# Patient Record
Sex: FEMALE | Race: BLACK OR AFRICAN AMERICAN | Employment: UNEMPLOYED | URBAN - METROPOLITAN AREA
[De-identification: names, ages, dates, MRNs, and addresses within clinical notes are randomized per-mention and may not be internally consistent; named-entity substitution may affect disease eponyms.]

---

## 2017-01-01 ENCOUNTER — TELEPHONE (OUTPATIENT)
Dept: CARDIOLOGY CLINIC | Age: 52
End: 2017-01-01

## 2017-01-01 ENCOUNTER — HOSPITAL ENCOUNTER (OUTPATIENT)
Dept: LAB | Age: 52
Discharge: HOME | End: 2017-09-05
Payer: MEDICARE

## 2017-01-01 ENCOUNTER — OFFICE VISIT (OUTPATIENT)
Dept: FAMILY MEDICINE CLINIC | Age: 52
End: 2017-01-01

## 2017-01-01 ENCOUNTER — HOSPITAL ENCOUNTER (OUTPATIENT)
Dept: LAB | Age: 52
Discharge: HOME | End: 2017-02-20
Payer: MEDICARE

## 2017-01-01 ENCOUNTER — HOSPITAL ENCOUNTER (OUTPATIENT)
Dept: LAB | Age: 52
Discharge: HOME | End: 2017-02-22
Payer: MEDICARE

## 2017-01-01 ENCOUNTER — TELEPHONE (OUTPATIENT)
Dept: FAMILY MEDICINE CLINIC | Age: 52
End: 2017-01-01

## 2017-01-01 ENCOUNTER — OFFICE VISIT (OUTPATIENT)
Dept: CARDIOLOGY CLINIC | Age: 52
End: 2017-01-01

## 2017-01-01 ENCOUNTER — HOSPITAL ENCOUNTER (OUTPATIENT)
Dept: CARDIAC CATH/INVASIVE PROCEDURES | Age: 52
Discharge: HOME | End: 2017-06-08
Attending: INTERNAL MEDICINE | Admitting: INTERNAL MEDICINE
Payer: MEDICARE

## 2017-01-01 ENCOUNTER — HOSPITAL ENCOUNTER (EMERGENCY)
Age: 52
Discharge: HOME | End: 2017-02-24
Attending: EMERGENCY MEDICINE
Payer: MEDICARE

## 2017-01-01 ENCOUNTER — HOSPITAL ENCOUNTER (OUTPATIENT)
Dept: MAMMOGRAPHY | Age: 52
Discharge: HOME | End: 2017-01-25
Attending: INTERNAL MEDICINE
Payer: MEDICARE

## 2017-01-01 ENCOUNTER — APPOINTMENT (OUTPATIENT)
Dept: CT IMAGING | Age: 52
End: 2017-01-01
Attending: EMERGENCY MEDICINE
Payer: MEDICARE

## 2017-01-01 ENCOUNTER — HOSPITAL ENCOUNTER (OUTPATIENT)
Dept: LAB | Age: 52
Discharge: HOME | End: 2017-07-17
Payer: MEDICARE

## 2017-01-01 VITALS
HEART RATE: 75 BPM | DIASTOLIC BLOOD PRESSURE: 73 MMHG | HEIGHT: 68 IN | SYSTOLIC BLOOD PRESSURE: 118 MMHG | WEIGHT: 149 LBS | BODY MASS INDEX: 22.58 KG/M2

## 2017-01-01 VITALS
HEART RATE: 79 BPM | OXYGEN SATURATION: 98 % | DIASTOLIC BLOOD PRESSURE: 90 MMHG | BODY MASS INDEX: 20.88 KG/M2 | TEMPERATURE: 98.1 F | SYSTOLIC BLOOD PRESSURE: 140 MMHG | RESPIRATION RATE: 18 BRPM | WEIGHT: 137.79 LBS | HEIGHT: 68 IN

## 2017-01-01 VITALS
HEART RATE: 86 BPM | OXYGEN SATURATION: 99 % | RESPIRATION RATE: 25 BRPM | HEIGHT: 68 IN | SYSTOLIC BLOOD PRESSURE: 140 MMHG | BODY MASS INDEX: 22.29 KG/M2 | WEIGHT: 147.05 LBS | DIASTOLIC BLOOD PRESSURE: 80 MMHG | TEMPERATURE: 98.5 F

## 2017-01-01 VITALS
WEIGHT: 152.5 LBS | BODY MASS INDEX: 23.11 KG/M2 | HEART RATE: 92 BPM | DIASTOLIC BLOOD PRESSURE: 114 MMHG | HEIGHT: 68 IN | RESPIRATION RATE: 20 BRPM | SYSTOLIC BLOOD PRESSURE: 182 MMHG | TEMPERATURE: 98.7 F

## 2017-01-01 VITALS
HEART RATE: 86 BPM | HEIGHT: 68 IN | TEMPERATURE: 98 F | WEIGHT: 142 LBS | SYSTOLIC BLOOD PRESSURE: 146 MMHG | DIASTOLIC BLOOD PRESSURE: 90 MMHG | BODY MASS INDEX: 21.52 KG/M2

## 2017-01-01 VITALS
HEIGHT: 68 IN | BODY MASS INDEX: 22.51 KG/M2 | SYSTOLIC BLOOD PRESSURE: 137 MMHG | TEMPERATURE: 100.1 F | RESPIRATION RATE: 16 BRPM | HEART RATE: 78 BPM | DIASTOLIC BLOOD PRESSURE: 87 MMHG | WEIGHT: 148.5 LBS

## 2017-01-01 VITALS
DIASTOLIC BLOOD PRESSURE: 91 MMHG | WEIGHT: 145 LBS | OXYGEN SATURATION: 97 % | TEMPERATURE: 98.2 F | RESPIRATION RATE: 16 BRPM | BODY MASS INDEX: 21.98 KG/M2 | HEART RATE: 82 BPM | SYSTOLIC BLOOD PRESSURE: 134 MMHG | HEIGHT: 68 IN

## 2017-01-01 VITALS
RESPIRATION RATE: 17 BRPM | SYSTOLIC BLOOD PRESSURE: 131 MMHG | HEIGHT: 68 IN | WEIGHT: 150 LBS | DIASTOLIC BLOOD PRESSURE: 80 MMHG | BODY MASS INDEX: 22.73 KG/M2 | OXYGEN SATURATION: 100 % | TEMPERATURE: 100.1 F | HEART RATE: 84 BPM

## 2017-01-01 VITALS
DIASTOLIC BLOOD PRESSURE: 71 MMHG | HEART RATE: 88 BPM | BODY MASS INDEX: 20.92 KG/M2 | SYSTOLIC BLOOD PRESSURE: 118 MMHG | HEIGHT: 68 IN | WEIGHT: 138 LBS

## 2017-01-01 DIAGNOSIS — R50.81 FEVER IN OTHER DISEASES: ICD-10-CM

## 2017-01-01 DIAGNOSIS — E55.9 VITAMIN D DEFICIENCY: ICD-10-CM

## 2017-01-01 DIAGNOSIS — N18.6 ESRD (END STAGE RENAL DISEASE) ON DIALYSIS (HCC): Chronic | ICD-10-CM

## 2017-01-01 DIAGNOSIS — D63.1 ANEMIA OF CHRONIC KIDNEY FAILURE, UNSPECIFIED STAGE: ICD-10-CM

## 2017-01-01 DIAGNOSIS — I27.20 PULMONARY HYPERTENSION (HCC): ICD-10-CM

## 2017-01-01 DIAGNOSIS — N18.6 ESRD (END STAGE RENAL DISEASE) (HCC): ICD-10-CM

## 2017-01-01 DIAGNOSIS — R11.2 NAUSEA AND VOMITING, INTRACTABILITY OF VOMITING NOT SPECIFIED, UNSPECIFIED VOMITING TYPE: ICD-10-CM

## 2017-01-01 DIAGNOSIS — K86.81 EXOCRINE PANCREATIC INSUFFICIENCY: ICD-10-CM

## 2017-01-01 DIAGNOSIS — K86.81 EXOCRINE PANCREATIC INSUFFICIENCY: Primary | ICD-10-CM

## 2017-01-01 DIAGNOSIS — J44.9 COPD MIXED TYPE (HCC): ICD-10-CM

## 2017-01-01 DIAGNOSIS — M62.838 MUSCLE SPASM: ICD-10-CM

## 2017-01-01 DIAGNOSIS — R11.2 NAUSEA AND VOMITING, INTRACTABILITY OF VOMITING NOT SPECIFIED, UNSPECIFIED VOMITING TYPE: Primary | ICD-10-CM

## 2017-01-01 DIAGNOSIS — J44.9 CHRONIC OBSTRUCTIVE PULMONARY DISEASE, UNSPECIFIED COPD TYPE (HCC): ICD-10-CM

## 2017-01-01 DIAGNOSIS — I12.0 BENIGN HYPERTENSION WITH ESRD (END-STAGE RENAL DISEASE) (HCC): ICD-10-CM

## 2017-01-01 DIAGNOSIS — I34.0 NON-RHEUMATIC MITRAL REGURGITATION: ICD-10-CM

## 2017-01-01 DIAGNOSIS — Z99.2 ESRD (END STAGE RENAL DISEASE) ON DIALYSIS (HCC): Chronic | ICD-10-CM

## 2017-01-01 DIAGNOSIS — N18.6 BENIGN HYPERTENSION WITH ESRD (END-STAGE RENAL DISEASE) (HCC): ICD-10-CM

## 2017-01-01 DIAGNOSIS — N18.6 ESRD (END STAGE RENAL DISEASE) ON DIALYSIS (HCC): Primary | Chronic | ICD-10-CM

## 2017-01-01 DIAGNOSIS — N90.89 LABIAL LESION: Primary | ICD-10-CM

## 2017-01-01 DIAGNOSIS — G89.4 CHRONIC PAIN SYNDROME: ICD-10-CM

## 2017-01-01 DIAGNOSIS — R25.2 JERKING MOVEMENTS OF EXTREMITIES: ICD-10-CM

## 2017-01-01 DIAGNOSIS — D75.829 HEPARIN INDUCED THROMBOCYTOPENIA: ICD-10-CM

## 2017-01-01 DIAGNOSIS — S40.022A HEMATOMA OF ARM, LEFT, INITIAL ENCOUNTER: Primary | ICD-10-CM

## 2017-01-01 DIAGNOSIS — I50.32 CHRONIC DIASTOLIC HEART FAILURE (HCC): Primary | ICD-10-CM

## 2017-01-01 DIAGNOSIS — M32.9 SYSTEMIC LUPUS ERYTHEMATOSUS, UNSPECIFIED SLE TYPE, UNSPECIFIED ORGAN INVOLVEMENT STATUS (HCC): ICD-10-CM

## 2017-01-01 DIAGNOSIS — E55.9 VITAMIN D DEFICIENCY: Primary | ICD-10-CM

## 2017-01-01 DIAGNOSIS — Z12.31 VISIT FOR SCREENING MAMMOGRAM: ICD-10-CM

## 2017-01-01 DIAGNOSIS — N18.9 ANEMIA OF CHRONIC KIDNEY FAILURE, UNSPECIFIED STAGE: ICD-10-CM

## 2017-01-01 DIAGNOSIS — I50.32 CHRONIC DIASTOLIC HEART FAILURE (HCC): ICD-10-CM

## 2017-01-01 DIAGNOSIS — N90.89 LESION OF LABIA: ICD-10-CM

## 2017-01-01 DIAGNOSIS — Z01.419 WELL WOMAN EXAM WITH ROUTINE GYNECOLOGICAL EXAM: Primary | ICD-10-CM

## 2017-01-01 DIAGNOSIS — I05.9 MITRAL VALVE DISORDERS(424.0): ICD-10-CM

## 2017-01-01 DIAGNOSIS — N18.6 ESRD (END STAGE RENAL DISEASE) (HCC): Primary | ICD-10-CM

## 2017-01-01 DIAGNOSIS — M32.9 LUPUS (HCC): ICD-10-CM

## 2017-01-01 DIAGNOSIS — R25.2 JERKING MOVEMENTS OF EXTREMITIES: Primary | ICD-10-CM

## 2017-01-01 DIAGNOSIS — Z99.2 ESRD (END STAGE RENAL DISEASE) ON DIALYSIS (HCC): Primary | Chronic | ICD-10-CM

## 2017-01-01 LAB
25(OH)D3 SERPL-MCNC: 9.1 NG/ML (ref 30–100)
A-G RATIO,AGRAT: 0.7 RATIO (ref 1.1–2.6)
ALBUMIN SERPL BCP-MCNC: 3.1 G/DL (ref 3.4–5)
ALBUMIN SERPL-MCNC: 3.6 G/DL (ref 3.5–5)
ALBUMIN/GLOB SERPL: 0.5 {RATIO} (ref 0.8–1.7)
ALP SERPL-CCNC: 245 U/L (ref 45–117)
ALP SERPL-CCNC: 295 U/L (ref 25–115)
ALT SERPL-CCNC: 15 U/L (ref 5–40)
ALT SERPL-CCNC: 21 U/L (ref 13–56)
ANION GAP BLD CALC-SCNC: 6 MMOL/L (ref 3–18)
ANION GAP BLD CALC-SCNC: 8 MMOL/L (ref 3–18)
ANION GAP SERPL CALC-SCNC: 16.2 MMOL/L
APTT PPP: 29 SEC (ref 22–36)
AST SERPL W P-5'-P-CCNC: 13 U/L (ref 15–37)
AST SERPL W P-5'-P-CCNC: 15 U/L (ref 10–37)
BASOPHILS # BLD AUTO: 0 K/UL (ref 0–0.06)
BASOPHILS # BLD: 1 % (ref 0–2)
BILIRUB SERPL-MCNC: 0.3 MG/DL (ref 0.2–1)
BILIRUB SERPL-MCNC: 0.3 MG/DL (ref 0.2–1.2)
BUN SERPL-MCNC: 25 MG/DL (ref 7–18)
BUN SERPL-MCNC: 50 MG/DL (ref 6–22)
BUN SERPL-MCNC: 60 MG/DL (ref 7–18)
BUN/CREAT SERPL: 11 (ref 12–20)
BUN/CREAT SERPL: 6 (ref 12–20)
CA-I SERPL ISE-MCNC: 4.7 MG/DL (ref 4.5–5.6)
CALCIUM SERPL-MCNC: 10 MG/DL (ref 8.5–10.1)
CALCIUM SERPL-MCNC: 8.2 MG/DL (ref 8.5–10.1)
CALCIUM SERPL-MCNC: 9.7 MG/DL (ref 8.4–10.5)
CHLORIDE SERPL-SCNC: 90 MMOL/L (ref 100–108)
CHLORIDE SERPL-SCNC: 94 MMOL/L (ref 100–108)
CHLORIDE SERPL-SCNC: 94 MMOL/L (ref 98–110)
CO2 SERPL-SCNC: 29 MMOL/L (ref 20–32)
CO2 SERPL-SCNC: 32 MMOL/L (ref 21–32)
CO2 SERPL-SCNC: 34 MMOL/L (ref 21–32)
CREAT SERPL-MCNC: 4.45 MG/DL (ref 0.6–1.3)
CREAT SERPL-MCNC: 5.58 MG/DL (ref 0.6–1.3)
CREAT SERPL-MCNC: 6.3 MG/DL (ref 0.5–1.2)
DIFFERENTIAL METHOD BLD: ABNORMAL
EOSINOPHIL # BLD: 0.1 K/UL (ref 0–0.4)
EOSINOPHIL NFR BLD: 2 % (ref 0–5)
ERYTHROCYTE [DISTWIDTH] IN BLOOD BY AUTOMATED COUNT: 16.8 % (ref 11.6–14.5)
ERYTHROCYTE [DISTWIDTH] IN BLOOD BY AUTOMATED COUNT: 16.9 % (ref 10–16)
ERYTHROCYTE [DISTWIDTH] IN BLOOD BY AUTOMATED COUNT: 17.4 % (ref 11.6–14.5)
GFRAA, 66117: 8.7
GFRNA, 66118: 7.2
GLOBULIN SER CALC-MCNC: 5.9 G/DL (ref 2–4)
GLOBULIN,GLOB: 5.3 G/DL (ref 2–4)
GLUCOSE BLD STRIP.AUTO-MCNC: 126 MG/DL (ref 70–110)
GLUCOSE SERPL-MCNC: 106 MG/DL (ref 65–99)
GLUCOSE SERPL-MCNC: 116 MG/DL (ref 74–99)
GLUCOSE SERPL-MCNC: 136 MG/DL (ref 74–99)
HCT VFR BLD AUTO: 33.3 % (ref 35–45)
HCT VFR BLD AUTO: 36.4 % (ref 35–45)
HCT VFR BLD AUTO: 36.9 % (ref 35.1–48)
HGB BLD-MCNC: 10.2 G/DL (ref 12–16)
HGB BLD-MCNC: 11.3 G/DL (ref 11.7–16)
HGB BLD-MCNC: 11.9 G/DL (ref 12–16)
INR PPP: 1 (ref 0.8–1.2)
INR PPP: 1.1 (ref 0.89–1.29)
LYMPHOCYTES # BLD AUTO: 24 % (ref 21–52)
LYMPHOCYTES # BLD: 0.8 K/UL (ref 0.9–3.6)
MAGNESIUM SERPL-MCNC: 2.1 MG/DL (ref 1.6–2.6)
MAGNESIUM SERPL-MCNC: 2.3 MG/DL (ref 1.6–2.5)
MCH RBC QN AUTO: 26 PG (ref 26–34)
MCH RBC QN AUTO: 26.8 PG (ref 24–34)
MCH RBC QN AUTO: 27.9 PG (ref 24–34)
MCHC RBC AUTO-ENTMCNC: 30.6 G/DL (ref 31–37)
MCHC RBC AUTO-ENTMCNC: 31 G/DL (ref 32–36)
MCHC RBC AUTO-ENTMCNC: 32.7 G/DL (ref 31–37)
MCV RBC AUTO: 85.2 FL (ref 74–97)
MCV RBC AUTO: 86 FL (ref 80–95)
MCV RBC AUTO: 87.6 FL (ref 74–97)
MONOCYTES # BLD: 0.4 K/UL (ref 0.05–1.2)
MONOCYTES NFR BLD AUTO: 10 % (ref 3–10)
NEUTS SEG # BLD: 2.2 K/UL (ref 1.8–8)
NEUTS SEG NFR BLD AUTO: 63 % (ref 40–73)
PHOSPHATE SERPL-MCNC: 3.1 MG/DL (ref 2.5–4.9)
PLATELET # BLD AUTO: 101 K/UL (ref 140–440)
PLATELET # BLD AUTO: 113 K/UL (ref 135–420)
PLATELET # BLD AUTO: 145 K/UL (ref 135–420)
PMV BLD AUTO: 10 FL (ref 9.2–11.8)
PMV BLD AUTO: 10.5 FL (ref 9.2–11.8)
PMV BLD AUTO: 9.6 FL (ref 6–10.8)
POTASSIUM SERPL-SCNC: 3.6 MMOL/L (ref 3.5–5.5)
POTASSIUM SERPL-SCNC: 3.6 MMOL/L (ref 3.5–5.5)
POTASSIUM SERPL-SCNC: 5.7 MMOL/L (ref 3.5–5.5)
PROT SERPL-MCNC: 8.9 G/DL (ref 6.4–8.3)
PROT SERPL-MCNC: 9 G/DL (ref 6.4–8.2)
PROTHROMBIN TIME: 11.4 SEC (ref 9–13)
PROTHROMBIN TIME: 12.4 SEC (ref 11.5–15.2)
RBC # BLD AUTO: 3.8 M/UL (ref 4.2–5.3)
RBC # BLD AUTO: 4.27 M/UL (ref 4.2–5.3)
RBC # BLD AUTO: 4.29 M/UL (ref 3.8–5.2)
SODIUM SERPL-SCNC: 130 MMOL/L (ref 136–145)
SODIUM SERPL-SCNC: 134 MMOL/L (ref 136–145)
SODIUM SERPL-SCNC: 139 MMOL/L (ref 133–145)
TSH SERPL DL<=0.005 MIU/L-ACNC: 1.57 MCU/ML (ref 0.27–4.2)
WBC # BLD AUTO: 3 K/UL (ref 4–11)
WBC # BLD AUTO: 3.5 K/UL (ref 4.6–13.2)
WBC # BLD AUTO: 4 K/UL (ref 4.6–13.2)

## 2017-01-01 PROCEDURE — 36415 COLL VENOUS BLD VENIPUNCTURE: CPT | Performed by: INTERNAL MEDICINE

## 2017-01-01 PROCEDURE — 73200 CT UPPER EXTREMITY W/O DYE: CPT

## 2017-01-01 PROCEDURE — 74011636320 HC RX REV CODE- 636/320: Performed by: INTERNAL MEDICINE

## 2017-01-01 PROCEDURE — 99152 MOD SED SAME PHYS/QHP 5/>YRS: CPT

## 2017-01-01 PROCEDURE — 74011250636 HC RX REV CODE- 250/636

## 2017-01-01 PROCEDURE — C1894 INTRO/SHEATH, NON-LASER: HCPCS

## 2017-01-01 PROCEDURE — 74011000250 HC RX REV CODE- 250: Performed by: INTERNAL MEDICINE

## 2017-01-01 PROCEDURE — 77063 BREAST TOMOSYNTHESIS BI: CPT

## 2017-01-01 PROCEDURE — 77030004534 HC CATH ANGI DX INFN CARD -A

## 2017-01-01 PROCEDURE — 85610 PROTHROMBIN TIME: CPT | Performed by: INTERNAL MEDICINE

## 2017-01-01 PROCEDURE — 77030013797 HC KT TRNSDUC PRSSR EDWD -A

## 2017-01-01 PROCEDURE — C1769 GUIDE WIRE: HCPCS

## 2017-01-01 PROCEDURE — C1751 CATH, INF, PER/CENT/MIDLINE: HCPCS

## 2017-01-01 PROCEDURE — 74011250636 HC RX REV CODE- 250/636: Performed by: INTERNAL MEDICINE

## 2017-01-01 PROCEDURE — 85027 COMPLETE CBC AUTOMATED: CPT | Performed by: INTERNAL MEDICINE

## 2017-01-01 PROCEDURE — 99282 EMERGENCY DEPT VISIT SF MDM: CPT

## 2017-01-01 PROCEDURE — 74011000250 HC RX REV CODE- 250

## 2017-01-01 PROCEDURE — 83735 ASSAY OF MAGNESIUM: CPT | Performed by: INTERNAL MEDICINE

## 2017-01-01 PROCEDURE — 93460 R&L HRT ART/VENTRICLE ANGIO: CPT

## 2017-01-01 PROCEDURE — 84100 ASSAY OF PHOSPHORUS: CPT | Performed by: INTERNAL MEDICINE

## 2017-01-01 PROCEDURE — 80048 BASIC METABOLIC PNL TOTAL CA: CPT | Performed by: INTERNAL MEDICINE

## 2017-01-01 PROCEDURE — 82962 GLUCOSE BLOOD TEST: CPT

## 2017-01-01 PROCEDURE — 82330 ASSAY OF CALCIUM: CPT | Performed by: INTERNAL MEDICINE

## 2017-01-01 PROCEDURE — 74011250637 HC RX REV CODE- 250/637: Performed by: INTERNAL MEDICINE

## 2017-01-01 PROCEDURE — 88175 CYTOPATH C/V AUTO FLUID REDO: CPT | Performed by: NURSE PRACTITIONER

## 2017-01-01 PROCEDURE — 99153 MOD SED SAME PHYS/QHP EA: CPT

## 2017-01-01 PROCEDURE — 82306 VITAMIN D 25 HYDROXY: CPT | Performed by: INTERNAL MEDICINE

## 2017-01-01 PROCEDURE — 85025 COMPLETE CBC W/AUTO DIFF WBC: CPT | Performed by: INTERNAL MEDICINE

## 2017-01-01 PROCEDURE — 80053 COMPREHEN METABOLIC PANEL: CPT | Performed by: INTERNAL MEDICINE

## 2017-01-01 RX ORDER — MIDAZOLAM HYDROCHLORIDE 1 MG/ML
.5-2 INJECTION, SOLUTION INTRAMUSCULAR; INTRAVENOUS
Status: DISCONTINUED | OUTPATIENT
Start: 2017-01-01 | End: 2017-01-01 | Stop reason: HOSPADM

## 2017-01-01 RX ORDER — SODIUM CHLORIDE 0.9 % (FLUSH) 0.9 %
5-10 SYRINGE (ML) INJECTION EVERY 8 HOURS
Status: DISCONTINUED | OUTPATIENT
Start: 2017-01-01 | End: 2017-01-01 | Stop reason: HOSPADM

## 2017-01-01 RX ORDER — LABETALOL HYDROCHLORIDE 5 MG/ML
INJECTION, SOLUTION INTRAVENOUS
Status: COMPLETED
Start: 2017-01-01 | End: 2017-01-01

## 2017-01-01 RX ORDER — DIPHENHYDRAMINE HCL 25 MG
25 CAPSULE ORAL 2 TIMES DAILY WITH MEALS
Qty: 4 CAP | Refills: 0 | Status: SHIPPED | OUTPATIENT
Start: 2017-01-01 | End: 2017-01-01

## 2017-01-01 RX ORDER — HEPARIN SODIUM 200 [USP'U]/100ML
500 INJECTION, SOLUTION INTRAVENOUS ONCE
Status: COMPLETED | OUTPATIENT
Start: 2017-01-01 | End: 2017-01-01

## 2017-01-01 RX ORDER — PROMETHAZINE HYDROCHLORIDE 25 MG/1
25 TABLET ORAL
Qty: 30 TAB | Refills: 3 | Status: SHIPPED | OUTPATIENT
Start: 2017-01-01 | End: 2017-01-01 | Stop reason: SDUPTHER

## 2017-01-01 RX ORDER — AMLODIPINE BESYLATE 10 MG/1
TABLET ORAL
Qty: 30 TAB | Refills: 6 | Status: SHIPPED | OUTPATIENT
Start: 2017-01-01 | End: 2017-01-01 | Stop reason: SDUPTHER

## 2017-01-01 RX ORDER — TRIMETHOBENZAMIDE HYDROCHLORIDE 300 MG/1
300 CAPSULE ORAL
Qty: 60 CAP | Refills: 0 | Status: SHIPPED | OUTPATIENT
Start: 2017-01-01 | End: 2017-01-01 | Stop reason: SDUPTHER

## 2017-01-01 RX ORDER — TRIMETHOBENZAMIDE HYDROCHLORIDE 300 MG/1
300 CAPSULE ORAL
Qty: 30 CAP | Refills: 0 | Status: SHIPPED | OUTPATIENT
Start: 2017-01-01 | End: 2017-01-01 | Stop reason: SDUPTHER

## 2017-01-01 RX ORDER — ERGOCALCIFEROL 1.25 MG/1
50000 CAPSULE ORAL
Qty: 4 CAP | Refills: 6 | Status: SHIPPED | OUTPATIENT
Start: 2017-01-01

## 2017-01-01 RX ORDER — FENTANYL CITRATE 50 UG/ML
12.5-1 INJECTION, SOLUTION INTRAMUSCULAR; INTRAVENOUS
Status: DISCONTINUED | OUTPATIENT
Start: 2017-01-01 | End: 2017-01-01 | Stop reason: HOSPADM

## 2017-01-01 RX ORDER — LABETALOL HYDROCHLORIDE 5 MG/ML
20 INJECTION, SOLUTION INTRAVENOUS ONCE
Status: COMPLETED | OUTPATIENT
Start: 2017-01-01 | End: 2017-01-01

## 2017-01-01 RX ORDER — SODIUM CHLORIDE 0.9 % (FLUSH) 0.9 %
5-10 SYRINGE (ML) INJECTION AS NEEDED
Status: DISCONTINUED | OUTPATIENT
Start: 2017-01-01 | End: 2017-01-01 | Stop reason: HOSPADM

## 2017-01-01 RX ORDER — ONDANSETRON 2 MG/ML
4 INJECTION INTRAMUSCULAR; INTRAVENOUS ONCE
Status: COMPLETED | OUTPATIENT
Start: 2017-01-01 | End: 2017-01-01

## 2017-01-01 RX ORDER — HYDRALAZINE HYDROCHLORIDE 20 MG/ML
20 INJECTION INTRAMUSCULAR; INTRAVENOUS ONCE
Status: COMPLETED | OUTPATIENT
Start: 2017-01-01 | End: 2017-01-01

## 2017-01-01 RX ORDER — AMLODIPINE BESYLATE 10 MG/1
TABLET ORAL
Qty: 30 TAB | Refills: 4 | Status: SHIPPED | OUTPATIENT
Start: 2017-01-01

## 2017-01-01 RX ORDER — LISINOPRIL 5 MG/1
TABLET ORAL
Qty: 30 TAB | Refills: 6 | Status: SHIPPED | OUTPATIENT
Start: 2017-01-01 | End: 2017-01-01 | Stop reason: ALTCHOICE

## 2017-01-01 RX ORDER — FAMOTIDINE 20 MG/1
20 TABLET, FILM COATED ORAL 2 TIMES DAILY
Qty: 4 TAB | Refills: 0 | Status: SHIPPED | OUTPATIENT
Start: 2017-01-01 | End: 2017-01-01

## 2017-01-01 RX ORDER — ALBUTEROL SULFATE 90 UG/1
2 AEROSOL, METERED RESPIRATORY (INHALATION)
Qty: 1 INHALER | Refills: 6 | Status: SHIPPED | OUTPATIENT
Start: 2017-01-01

## 2017-01-01 RX ORDER — VERAPAMIL HYDROCHLORIDE 2.5 MG/ML
5 INJECTION, SOLUTION INTRAVENOUS ONCE
Status: DISCONTINUED | OUTPATIENT
Start: 2017-01-01 | End: 2017-01-01 | Stop reason: HOSPADM

## 2017-01-01 RX ORDER — CARVEDILOL 25 MG/1
TABLET ORAL
Qty: 60 TAB | Refills: 1 | Status: SHIPPED | OUTPATIENT
Start: 2017-01-01

## 2017-01-01 RX ORDER — HYDRALAZINE HYDROCHLORIDE 20 MG/ML
INJECTION INTRAMUSCULAR; INTRAVENOUS
Status: COMPLETED
Start: 2017-01-01 | End: 2017-01-01

## 2017-01-01 RX ORDER — TRIMETHOBENZAMIDE HYDROCHLORIDE 300 MG/1
CAPSULE ORAL
Qty: 60 CAP | Refills: 0 | Status: SHIPPED | OUTPATIENT
Start: 2017-01-01

## 2017-01-01 RX ORDER — LIDOCAINE HYDROCHLORIDE 10 MG/ML
1-30 INJECTION, SOLUTION EPIDURAL; INFILTRATION; INTRACAUDAL; PERINEURAL
Status: DISCONTINUED | OUTPATIENT
Start: 2017-01-01 | End: 2017-01-01 | Stop reason: HOSPADM

## 2017-01-01 RX ORDER — SUCRALFATE 1 G/10ML
SUSPENSION ORAL
Qty: 240 ML | Refills: 0 | Status: SHIPPED | OUTPATIENT
Start: 2017-01-01

## 2017-01-01 RX ORDER — HEPARIN SODIUM 1000 [USP'U]/ML
1000-10000 INJECTION, SOLUTION INTRAVENOUS; SUBCUTANEOUS
Status: DISCONTINUED | OUTPATIENT
Start: 2017-01-01 | End: 2017-01-01 | Stop reason: HOSPADM

## 2017-01-01 RX ORDER — ONDANSETRON 2 MG/ML
INJECTION INTRAMUSCULAR; INTRAVENOUS
Status: COMPLETED
Start: 2017-01-01 | End: 2017-01-01

## 2017-01-01 RX ORDER — PREDNISONE 20 MG/1
20 TABLET ORAL 2 TIMES DAILY WITH MEALS
Qty: 4 TAB | Refills: 0 | Status: SHIPPED | OUTPATIENT
Start: 2017-01-01 | End: 2017-01-01

## 2017-01-01 RX ORDER — ERGOCALCIFEROL 1.25 MG/1
50000 CAPSULE ORAL
Qty: 12 CAP | Refills: 3 | Status: SHIPPED | OUTPATIENT
Start: 2017-01-01 | End: 2017-01-01 | Stop reason: SDUPTHER

## 2017-01-01 RX ORDER — OXYCODONE HYDROCHLORIDE 5 MG/1
15 TABLET ORAL
Status: DISCONTINUED | OUTPATIENT
Start: 2017-01-01 | End: 2017-01-01 | Stop reason: HOSPADM

## 2017-01-01 RX ADMIN — LABETALOL HYDROCHLORIDE 10 MG: 5 INJECTION, SOLUTION INTRAVENOUS at 11:25

## 2017-01-01 RX ADMIN — FENTANYL CITRATE 25 MCG: 50 INJECTION INTRAMUSCULAR; INTRAVENOUS at 11:14

## 2017-01-01 RX ADMIN — HYDRALAZINE HYDROCHLORIDE 10 MG: 20 INJECTION INTRAMUSCULAR; INTRAVENOUS at 11:18

## 2017-01-01 RX ADMIN — ONDANSETRON 4 MG: 2 INJECTION INTRAMUSCULAR; INTRAVENOUS at 08:55

## 2017-01-01 RX ADMIN — HYDRALAZINE HYDROCHLORIDE 10 MG: 20 INJECTION INTRAMUSCULAR; INTRAVENOUS at 12:13

## 2017-01-01 RX ADMIN — HEPARIN SODIUM 1000 UNITS: 200 INJECTION, SOLUTION INTRAVENOUS at 11:27

## 2017-01-01 RX ADMIN — FENTANYL CITRATE 25 MCG: 50 INJECTION INTRAMUSCULAR; INTRAVENOUS at 12:12

## 2017-01-01 RX ADMIN — LIDOCAINE HYDROCHLORIDE 24 ML: 10 INJECTION, SOLUTION EPIDURAL; INFILTRATION; INTRACAUDAL; PERINEURAL at 11:26

## 2017-01-01 RX ADMIN — IOPAMIDOL 50 ML: 612 INJECTION, SOLUTION INTRAVENOUS at 12:26

## 2017-01-01 RX ADMIN — LABETALOL HYDROCHLORIDE 10 MG: 5 INJECTION, SOLUTION INTRAVENOUS at 12:24

## 2017-01-01 RX ADMIN — MIDAZOLAM HYDROCHLORIDE 1 MG: 1 INJECTION, SOLUTION INTRAMUSCULAR; INTRAVENOUS at 11:14

## 2017-01-01 RX ADMIN — OXYCODONE HYDROCHLORIDE 15 MG: 5 TABLET ORAL at 16:00

## 2017-01-01 RX ADMIN — FENTANYL CITRATE 25 MCG: 50 INJECTION INTRAMUSCULAR; INTRAVENOUS at 11:18

## 2017-01-01 RX ADMIN — MIDAZOLAM HYDROCHLORIDE 1 MG: 1 INJECTION, SOLUTION INTRAMUSCULAR; INTRAVENOUS at 12:12

## 2017-01-01 RX ADMIN — OXYCODONE HYDROCHLORIDE 15 MG: 5 TABLET ORAL at 10:16

## 2017-01-18 NOTE — PROGRESS NOTES
HISTORY OF PRESENT ILLNESS  Sherri Guevara is a 54 y.o. female. Hypertension   The history is provided by the patient. This is a chronic problem. The current episode started more than 1 week ago. The problem occurs daily. The problem has been gradually improving. Pertinent negatives include no chest pain, no abdominal pain, no headaches and no shortness of breath. CHF   The history is provided by the patient. This is a chronic problem. The current episode started more than 1 week ago. The problem occurs every several days. The problem has not changed since onset. Pertinent negatives include no chest pain, no abdominal pain, no headaches and no shortness of breath. Valvular Heart Disease   The history is provided by the patient. This is a chronic problem. The current episode started more than 1 week ago. The problem occurs every several days. The problem has not changed since onset. Pertinent negatives include no chest pain, no abdominal pain, no headaches and no shortness of breath. Review of Systems   Constitutional: Negative for chills, diaphoresis, fever and weight loss. HENT: Negative for ear pain and hearing loss. Eyes: Negative for blurred vision. Respiratory: Negative for cough, hemoptysis, sputum production, shortness of breath, wheezing and stridor. Cardiovascular: Negative for chest pain, palpitations, orthopnea, claudication, leg swelling and PND. Gastrointestinal: Negative for abdominal pain, heartburn, nausea and vomiting. Musculoskeletal: Negative for myalgias and neck pain. Skin: Negative for rash. Neurological: Negative for dizziness, tingling, tremors, focal weakness, loss of consciousness, weakness and headaches. Psychiatric/Behavioral: Negative for depression and suicidal ideas.      Family History   Problem Relation Age of Onset    Hypertension Mother     Heart Disease Mother     Stroke Father     Heart Disease Father     Heart Disease Maternal Aunt     Diabetes Maternal Aunt        Past Medical History   Diagnosis Date    Cardiomegaly 10/1/2013    Chronic diastolic heart failure ([address] 10/1/2013     sob on activity     Chronic diastolic heart failure (HCC)     Chronic kidney disease      dialysis    Chronic obstructive pulmonary disease ([address] 10/1/2013    Chronic pain     Cirrhosis ([address]     Dialysis patient ([address] 12/17/2012    ESRD (end stage renal disease) ([address]     ESRD (end stage renal disease) ([address] 7/27/2016     HD since 9/11     Essential hypertension, benign 10/1/2013     severly elevated     Essential hypertension, benign 10/1/2013     severly elevated     Hypertension     Lupus ([address]     Mitral valve disorders 10/1/2013    Mitral valve disorders 10/1/2013    On home oxygen therapy      3 liters /min NC    Pancreatitis     Pancreatitis chronic     Precordial pain     Stroke ([address]      1998    Tricuspid valve disorders, specified as nonrheumatic 10/1/2013       Past Surgical History   Procedure Laterality Date    Hx cholecystectomy      Hx gyn       BTL    Hx gyn       4 c-sections    Hx other surgical       renal dialysis shunt    Hx other surgical       cysts in the axillary regions -        Social History   Substance Use Topics    Smoking status: Former Smoker     Packs/day: 1.00     Years: 19.00     Types: Cigarettes     Quit date: 1/1/2003    Smokeless tobacco: Never Used    Alcohol use No       Allergies   Allergen Reactions    Contrast Agent [Iodine] Angioedema    Heparin Analogues Other (comments)     Thrombocytopenia, positive HIT panel (5/15/15)    Prednisone Other (comments)     Became comatosed       Outpatient Prescriptions Marked as Taking for the 1/18/17 encounter (Office Visit) with Salima Goodson MD   Medication Sig Dispense Refill    promethazine (PHENERGAN) 25 mg tablet Take 1 Tab by mouth every six (6) hours as needed for Nausea.  12 Tab 3    Blood-Glucose Meter monitoring kit Check blood fasting blood sugar and also after largest meal of the day 1 Kit 0    glucose blood VI test strips (BLOOD GLUCOSE TEST) strip Check Fasting Blood Sugar and after largest meal of the day 100 Strip 3    lancets 32 gauge misc 100 Each by Does Not Apply route two (2) times daily (with meals). 100 Lancet 1    carvedilol (COREG) 25 mg tablet Take 1 Tab by mouth two (2) times daily (with meals). 60 Tab 6    umeclidinium-vilanterol (ANORO ELLIPTA) 62.5-25 mcg/actuation inhaler Take 1 Puff by inhalation daily. Indications: BRONCHOSPASM PREVENTION WITH COPD 1 Inhaler 3    FOLIC ACID/VIT BCOMP,C (MIROSLAVA-IAIN PO) Take  by mouth.  amLODIPine (NORVASC) 10 mg tablet Take 1 Tab by mouth daily. 30 Tab 6    oxyCODONE-acetaminophen (PERCOCET 7.5) 7.5-325 mg per tablet Take 2 Tabs by mouth every four (4) hours as needed. Max Daily Amount: 12 Tabs. 20 Tab 0    albuterol-ipratropium (DUO-NEB) 2.5 mg-0.5 mg/3 ml nebu 3 mL by Nebulization route.  amylase-lipase-protease (CREON) 12,000-38,000 -60,000 unit capsule Take 1 Cap by mouth three (3) times daily (with meals). (Patient taking differently: Take  by mouth daily.) 90 Cap 0    sevelamer carbonate (RENVELA) 800 mg tab tab Take 3 Tabs by mouth three (3) times daily (with meals). 90 Tab 0    LORazepam (ATIVAN) 0.5 mg tablet Take 1 Tab by mouth two (2) times daily as needed for Anxiety. Max Daily Amount: 1 mg. 30 Tab 0    aspirin delayed-release 81 mg tablet Take  by mouth daily.  albuterol (PROVENTIL HFA, VENTOLIN HFA) 90 mcg/actuation inhaler Take 2 Puffs by inhalation every six (6) hours as needed.  ondansetron hcl (ZOFRAN) 4 mg tablet Take 4 mg by mouth every eight (8) hours as needed.  oxyCODONE IR (ROXICODONE) 15 mg immediate release tablet Take 15 mg by mouth two (2) times daily as needed.             Visit Vitals    /71    Pulse 88    Ht 5' 8\" (1.727 m)    Wt 62.6 kg (138 lb)    BMI 20.98 kg/m2     Physical Exam   Constitutional: She is oriented to person, place, and time. She appears well-developed and well-nourished. No distress. HENT:   Head: Atraumatic. Mouth/Throat: No oropharyngeal exudate. Eyes: Conjunctivae are normal. No scleral icterus. Neck: Neck supple. No JVD present. Cardiovascular: Normal rate and regular rhythm. Exam reveals no gallop. Murmur: 2/6 ejection systolic murmur best heard at aortic area and apexl. Pulmonary/Chest: Effort normal and breath sounds normal. No stridor. She has no wheezes. She has no rales. Abdominal: Soft. There is no tenderness. There is no rebound. Musculoskeletal: Normal range of motion. She exhibits no edema or tenderness. Neurological: She is alert and oriented to person, place, and time. She exhibits normal muscle tone. Skin: Skin is warm. She is not diaphoretic. Psychiatric: She has a normal mood and affect. Her behavior is normal.       ASSESSMENT and PLAN    ICD-10-CM ICD-9-CM    1. Chronic diastolic heart failure ([facility]) I50.32 428.32    2. Mitral valve disorders I05.9 424.0    3. Benign hypertension with ESRD (end-stage renal disease) ([facility]) I12.0 403.11     N18.6 585.6    4. ESRD (end stage renal disease) on dialysis ([facility]) N18.6 585.6     Z99.2 V45.11    5. Lupus ([facility] M32.9 710.0    6. Anemia of chronic kidney failure, unspecified stage N18.9 285.21     D63.1     7. ESRD (end stage renal disease) ([facility] N18.6 585.6      No orders of the defined types were placed in this encounter. Follow-up Disposition:  Return in about 6 months (around 7/18/2017). current treatment plan is effective, no change in therapy  reviewed diet, exercise and weight control  use of aspirin to prevent MI and TIA's discussed. Patient's BP is significantly better. Has MR murmur is also better than prior examinations.   Continue current meds

## 2017-01-18 NOTE — MR AVS SNAPSHOT
Visit Information Date & Time Provider Department Dept. Phone Encounter #  
 1/18/2017  1:15 PM Helena Villafana MD [facility] [phone] Follow-up Instructions Return in about 6 months (around 7/18/2017). Follow-up and Disposition History Your Appointments 2/20/2017 11:30 AM  
PAP/PELVIC with Bry Hwang NP [address] (--) Appt Note: Well Woman Exam  
 Mattie Asif [address] [address]  
  
    
 2/22/2017  1:15 PM  
Follow Up with Chen Garner DO [address] (--) Appt Note: fu  
 Mattie [address] [place] [address] Road [address]  
  
    
 7/26/2017  1:15 PM  
Follow Up with Helena Villafana MD  
[facility] ([facility]) Appt Note: 6 month follow up  
 [address] [facility] [address]  
  
   
 [address] [address] [address] [address] Upcoming Health Maintenance Date Due Pneumococcal 19-64 Highest Risk (1 of 3 - PCV13) 7/1/1986 DTaP/Tdap/Td series (1 - Tdap) 7/1/1988 PAP AKA CERVICAL CYTOLOGY 7/1/1988 INFLUENZA AGE 9 TO ADULT 8/1/2016 Allergies as of 1/18/2017  Review Complete On: 1/18/2017 By: Helena Villafana MD  
  
 Severity Noted Reaction Type Reactions Contrast Agent [Iodine] High 05/13/2015    Angioedema Heparin Analogues High 05/20/2015   Side Effect Other (comments) Thrombocytopenia, positive HIT panel (5/15/15) Prednisone  05/13/2015    Other (comments) Became comatosed Current Immunizations  Reviewed on 5/17/2015 No immunizations on file. Not reviewed this visit You Were Diagnosed With   
  
 Codes Comments  Chronic diastolic heart failure (HCC)    -  Primary ICD-10-CM: I50.32 
ICD-9-CM: 428.32   
 Mitral valve disorders     ICD-10-CM: I05.9 ICD-9-CM: 424.0 Benign hypertension with ESRD (end-stage renal disease) ([facility]     ICD-10-CM: I12.0, N18.6 ICD-9-CM: 403.11, 585.6 ESRD (end stage renal disease) on dialysis ([facility]     ICD-10-CM: N18.6, Z99.2 ICD-9-CM: 585.6, V45.11 Lupus ([facility]     ICD-10-CM: M32.9 ICD-9-CM: 710.0 Anemia of chronic kidney failure, unspecified stage     ICD-10-CM: N18.9, D63.1 ICD-9-CM: 285.21   
 ESRD (end stage renal disease) ([facility]     ICD-10-CM: N18.6 ICD-9-CM: 120. 6 Vitals BP Pulse Height(growth percentile) Weight(growth percentile) BMI OB Status 118/71 88 5' 8\" (1.727 m) 138 lb (62.6 kg) 20.98 kg/m2 Medically Induced Smoking Status Former Smoker Vitals History BMI and BSA Data Body Mass Index Body Surface Area  
 20.98 kg/m 2 1.73 m 2 Preferred Pharmacy Pharmacy Name Phone [phone], [address] Your Updated Medication List  
  
   
This list is accurate as of: 1/18/17  2:59 PM.  Always use your most recent med list.  
  
  
  
  
 albuterol 90 mcg/actuation inhaler Commonly known as:  PROVENTIL HFA, VENTOLIN HFA, PROAIR HFA Take 2 Puffs by inhalation every six (6) hours as needed. albuterol-ipratropium 2.5 mg-0.5 mg/3 ml Nebu Commonly known as:  DUO-NEB  
3 mL by Nebulization route. amLODIPine 10 mg tablet Commonly known as:  Kendy Fraction Take 1 Tab by mouth daily. amylase-lipase-protease 12,000-38,000 -60,000 unit capsule Commonly known as:  CREON Take 1 Cap by mouth three (3) times daily (with meals). aspirin delayed-release 81 mg tablet Take  by mouth daily. Blood-Glucose Meter monitoring kit Check blood fasting blood sugar and also after largest meal of the day  
  
 carvedilol 25 mg tablet Commonly known as:  Chana Guardado Take 1 Tab by mouth two (2) times daily (with meals). glipiZIDE SR 2.5 mg CR tablet Commonly known as:  GLUCOTROL XL Take 1 Tab by mouth daily. glucose blood VI test strips strip Commonly known as:  blood glucose test  
Check Fasting Blood Sugar and after largest meal of the day  
  
 lancets 32 gauge Misc  
100 Each by Does Not Apply route two (2) times daily (with meals). lisinopril 5 mg tablet Commonly known as:  [place] Escort Take 1 Tab by mouth daily. LORazepam 0.5 mg tablet Commonly known as:  ATIVAN Take 1 Tab by mouth two (2) times daily as needed for Anxiety. Max Daily Amount: 1 mg.  
  
 oxyCODONE-acetaminophen 7.5-325 mg per tablet Commonly known as:  PERCOCET 7.5 Take 2 Tabs by mouth every four (4) hours as needed. Max Daily Amount: 12 Tabs. PHOSLO 667 mg Cap Generic drug:  calcium acetate Take  by mouth three (3) times daily (with meals). promethazine 25 mg tablet Commonly known as:  PHENERGAN Take 1 Tab by mouth every six (6) hours as needed for Nausea. MIROSLAVA-IAIN PO Take  by mouth. ROXICODONE 15 mg immediate release tablet Generic drug:  oxyCODONE IR Take 15 mg by mouth two (2) times daily as needed. sevelamer carbonate 800 mg Tab tab Commonly known as:  Bonnee Pine Take 3 Tabs by mouth three (3) times daily (with meals). umeclidinium-vilanterol 62.5-25 mcg/actuation inhaler Commonly known as:  Clotilde Frisk Take 1 Puff by inhalation daily. Indications: BRONCHOSPASM PREVENTION WITH COPD  
  
 ZOFRAN (AS HYDROCHLORIDE) 4 mg tablet Generic drug:  ondansetron hcl Take 4 mg by mouth every eight (8) hours as needed. Follow-up Instructions Return in about 6 months (around 7/18/2017). To-Do List   
 01/25/2017 2:00 PM  
  Appointment with MARCOS SIMPSON at [address] [place] [address] ([phone]) PAYMENT  For Non-Medicare patients - $15.00 will be collected from you at the time of your exam.  You will be billed $35.00 from the reading [facility]. OUTSIDE FILMS  - Any outside films related to the study being scheduled should be brought with you on the day of the exam.  If this cannot be done there may be a delay in the reading of the study. MEDICATIONS  - Patient must bring a complete list of all medications currently taking to include prescriptions, over-the-counter meds, herbals, vitamins & any dietary supplements  GENERAL INSTRUCTIONS  - On the day of your exam do not use any bath powder, deodorant or lotions on the armpit area. -Tenderness of breasts may cause an increase of discomfort during procedure. If you are experiencing breast tenderness on the day of your appointment and would like to reschedule, please call [phone]. Patient Instructions High Blood Pressure: Care Instructions Your Care Instructions If your blood pressure is usually above 140/90, you have high blood pressure, or hypertension. That means the top number is 140 or higher or the bottom number is 90 or higher, or both. Despite what a lot of people think, high blood pressure usually doesn't cause headaches or make you feel dizzy or lightheaded. It usually has no symptoms. But it does increase your risk for heart attack, stroke, and kidney or eye damage. The higher your blood pressure, the more your risk increases. Your doctor will give you a goal for your blood pressure. Your goal will be based on your health and your age. An example of a goal is to keep your blood pressure below 140/90. Lifestyle changes, such as eating healthy and being active, are always important to help lower blood pressure. You might also take medicine to reach your blood pressure goal. 
Follow-up care is a key part of your treatment and safety. Be sure to make and go to all appointments, and call your doctor if you are having problems.  It's also a good idea to know your test results and keep a list of the medicines you take. How can you care for yourself at home? Medical treatment · If you stop taking your medicine, your blood pressure will go back up. You may take one or more types of medicine to lower your blood pressure. Be safe with medicines. Take your medicine exactly as prescribed. Call your doctor if you think you are having a problem with your medicine. · Talk to your doctor before you start taking aspirin every day. Aspirin can help certain people lower their risk of a heart attack or stroke. But taking aspirin isn't right for everyone, because it can cause serious bleeding. · See your doctor regularly. You may need to see the doctor more often at first or until your blood pressure comes down. · If you are taking blood pressure medicine, talk to your doctor before you take decongestants or anti-inflammatory medicine, such as ibuprofen. Some of these medicines can raise blood pressure. · Learn how to check your blood pressure at home. Lifestyle changes · Stay at a healthy weight. This is especially important if you put on weight around the waist. Losing even 10 pounds can help you lower your blood pressure. · If your doctor recommends it, get more exercise. Walking is a good choice. Bit by bit, increase the amount you walk every day. Try for at least 30 minutes on most days of the week. You also may want to swim, bike, or do other activities. · Avoid or limit alcohol. Talk to your doctor about whether you can drink any alcohol. · Try to limit how much sodium you eat to less than 2,300 milligrams (mg) a day. Your doctor may ask you to try to eat less than 1,500 mg a day. · Eat plenty of fruits (such as bananas and oranges), vegetables, legumes, whole grains, and low-fat dairy products. · Lower the amount of saturated fat in your diet. Saturated fat is found in animal products such as milk, cheese, and meat.  Limiting these foods may help you lose weight and also lower your risk for heart disease. · Do not smoke. Smoking increases your risk for heart attack and stroke. If you need help quitting, talk to your doctor about stop-smoking programs and medicines. These can increase your chances of quitting for good. When should you call for help? Call 911 anytime you think you may need emergency care. This may mean having symptoms that suggest that your blood pressure is causing a serious heart or blood vessel problem. Your blood pressure may be over 180/110. For example, call 911 if: 
· You have symptoms of a heart attack. These may include: ¨ Chest pain or pressure, or a strange feeling in the chest. 
¨ Sweating. ¨ Shortness of breath. ¨ Nausea or vomiting. ¨ Pain, pressure, or a strange feeling in the back, neck, jaw, or upper belly or in one or both shoulders or arms. ¨ Lightheadedness or sudden weakness. ¨ A fast or irregular heartbeat. · You have symptoms of a stroke. These may include: 
¨ Sudden numbness, tingling, weakness, or loss of movement in your face, arm, or leg, especially on only one side of your body. ¨ Sudden vision changes. ¨ Sudden trouble speaking. ¨ Sudden confusion or trouble understanding simple statements. ¨ Sudden problems with walking or balance. ¨ A sudden, severe headache that is different from past headaches. · You have severe back or belly pain. Do not wait until your blood pressure comes down on its own. Get help right away. Call your doctor now or seek immediate care if: 
· Your blood pressure is much higher than normal (such as 180/110 or higher), but you don't have symptoms. · You think high blood pressure is causing symptoms, such as: ¨ Severe headache. ¨ Blurry vision. Watch closely for changes in your health, and be sure to contact your doctor if: 
· Your blood pressure measures 140/90 or higher at least 2 times.  That means the top number is 140 or higher or the bottom number is 90 or higher, or both. · You think you may be having side effects from your blood pressure medicine. · Your blood pressure is usually normal, but it goes above normal at least 2 times. Where can you learn more? Go to http://romeo-mónica.info/. Enter S213 in the search box to learn more about \"High Blood Pressure: Care Instructions. \" Current as of: August 8, 2016 Content Version: 11.1 © 2316-6606 [employer]. Care instructions adapted under license by [employer] (which disclaims liability or warranty for this information). If you have questions about a medical condition or this instruction, always ask your healthcare professional. [place] [address] any warranty or liability for your use of this information. Patient Instructions History Please provide this summary of care documentation to your next provider. Your primary care clinician is listed as Mansoor Jovel. If you have any questions after today's visit, please call [phone].

## 2017-01-18 NOTE — PATIENT INSTRUCTIONS

## 2017-01-18 NOTE — PROGRESS NOTES
1. Have you been to the ER, urgent care clinic since your last visit? Hospitalized since your last visit? No    2. Have you seen or consulted any other health care providers outside of the [address] [place] [address] since your last visit? Include any pap smears or colon screening. Yes Where: Dr Jones/Pcp Dr Scarlett Gilbert     3. Since your last visit, have you had any of the following symptoms?      palpitations, shortness of breath and dizziness. 4.  Have you had any blood work, X-rays or cardiac testing? Yes Dr Flaca Steel            5.  Where do you normally have your labs drawn? [facility]    6. Do you need any refills today?    No

## 2017-01-30 NOTE — TELEPHONE ENCOUNTER
Justus Bose,   Please let the patient know that her mammogram was normal. Also, I don't know why she has an appointment with Mrs. Duyen Borja, the patient should be seeing me. Please check with the patient to see if this scheduling error is just a mistake. Thanks.     DAMIR

## 2017-01-30 NOTE — TELEPHONE ENCOUNTER
Attempted to contact pt at  number, no answer. Lvm for pt to return call to office at [phone]. Will continue to try to contact pt.

## 2017-02-20 NOTE — PROGRESS NOTES
Chief Complaint   Patient presents with    Well Woman     1. Have you been to the ER, urgent care clinic since your last visit? Hospitalized since your last visit? No    2. Have you seen or consulted any other health care providers outside of the [facility] [place] since your last visit? Include any pap smears or colon screening.  No

## 2017-02-20 NOTE — PATIENT INSTRUCTIONS
Please contact our office if you have any questions about your visit today. Pap Test: Care Instructions  Your Care Instructions  The Pap test (also called a Pap smear) is a screening test for cancer of the cervix, which is the lower part of the uterus that opens into the vagina. The test can help your doctor find early changes in the cells that could lead to cancer. The sample of cells taken during your test has been sent to a lab so that an expert can look at the cells. It usually takes a week or two to get the results back. Follow-up care is a key part of your treatment and safety. Be sure to make and go to all appointments, and call your doctor if you are having problems. It's also a good idea to know your test results and keep a list of the medicines you take. What do the results mean? · A normal result means that the test did not find any abnormal cells in the sample. · An abnormal result can mean many things. Most of these are not cancer. The results of your test may be abnormal because:  ¨ You have an infection of the vagina or cervix, such as a yeast infection. ¨ You have an IUD (intrauterine device for birth control). ¨ You have low estrogen levels after menopause that are causing the cells to change. ¨ You have cell changes that may be a sign of precancer or cancer. The results are ranked based on how serious the changes might be. There are many other reasons why you might not get a normal result. If the results were abnormal, you may need to get another test within a few weeks or months. If the results show changes that could be a sign of cancer, you may need a test called a colposcopy, which provides a more complete view of the cervix. Sometimes the lab cannot use the sample because it does not contain enough cells or was not preserved well. If so, you may need to have the test again. This is not common, but it does happen from time to time. When should you call for help?   Watch closely for changes in your health, and be sure to contact your doctor if:  · You have vaginal bleeding or pain for more than 2 days after the test. It is normal to have a small amount of bleeding for a day or two after the test.  Where can you learn more? Go to http://romeo-mónica.info/. Enter M129 in the search box to learn more about \"Pap Test: Care Instructions. \"  Current as of: July 26, 2016  Content Version: 11.1  © 1227-1417 [employer]. Care instructions adapted under license by [employer] (which disclaims liability or warranty for this information). If you have questions about a medical condition or this instruction, always ask your healthcare professional. [address] any warranty or liability for your use of this information.

## 2017-02-20 NOTE — PROGRESS NOTES
ALEXA Harrell is a 54 y.o. female patient of Dr. Alana Wallace. Chief Complaint   Patient presents with    Well Woman   Reports chronic nausea and vomiting. On dialysis three days a week. Reports being a candidate for a Kidney transplant. Reports Well women exam is needed as part of requirement. Reports mammogram last week. Declines clinical breast exam. Denies being sexually active. Declines STD screening.     Past Medical History  Past Medical History:   Diagnosis Date    Cardiomegaly 10/1/2013    Chronic diastolic heart failure ([address] 10/1/2013    sob on activity     Chronic diastolic heart failure (HCC)     Chronic kidney disease     dialysis    Chronic obstructive pulmonary disease ([address] 10/1/2013    Chronic pain     Cirrhosis ([address]     Dialysis patient ([address] 12/17/2012    ESRD (end stage renal disease) ([address]     ESRD (end stage renal disease) ([address] 7/27/2016    HD since 9/11     Essential hypertension, benign 10/1/2013    severly elevated     Essential hypertension, benign 10/1/2013    severly elevated     Hypertension     Lupus ([address]     Mitral valve disorders 10/1/2013    Mitral valve disorders 10/1/2013    On home oxygen therapy     3 liters /min NC    Pancreatitis     Pancreatitis chronic     Precordial pain     Stroke ([address]     1998    Tricuspid valve disorders, specified as nonrheumatic 10/1/2013       Surgical History  Past Surgical History:   Procedure Laterality Date    HX CHOLECYSTECTOMY      HX GYN      BTL    HX GYN      4 c-sections    HX OTHER SURGICAL      renal dialysis shunt    HX OTHER SURGICAL      cysts in the axillary regions -         Medications  Current Outpatient Prescriptions   Medication Sig Dispense Refill    amLODIPine (NORVASC) 10 mg tablet take 1 tablet by mouth once daily 30 Tab 6    lisinopril (PRINIVIL, ZESTRIL) 5 mg tablet take 1 tablet by mouth once daily 30 Tab 6    CARAFATE 100 mg/mL suspension TAKE 10 ML BY MOUTH FOUR TIMES DAILY BEFORE MEALS AND AT BEDTIME 240 mL 0    promethazine (PHENERGAN) 25 mg tablet Take 1 Tab by mouth every six (6) hours as needed for Nausea. 12 Tab 3    Blood-Glucose Meter monitoring kit Check blood fasting blood sugar and also after largest meal of the day 1 Kit 0    glucose blood VI test strips (BLOOD GLUCOSE TEST) strip Check Fasting Blood Sugar and after largest meal of the day 100 Strip 3    lancets 32 gauge misc 100 Each by Does Not Apply route two (2) times daily (with meals). 100 Lancet 1    carvedilol (COREG) 25 mg tablet Take 1 Tab by mouth two (2) times daily (with meals). 60 Tab 6    umeclidinium-vilanterol (ANORO ELLIPTA) 62.5-25 mcg/actuation inhaler Take 1 Puff by inhalation daily. Indications: BRONCHOSPASM PREVENTION WITH COPD 1 Inhaler 3    FOLIC ACID/VIT BCOMP,C (MIROSLAVA-IAIN PO) Take  by mouth.  calcium acetate (PHOSLO) 667 mg cap Take  by mouth three (3) times daily (with meals).  oxyCODONE-acetaminophen (PERCOCET 7.5) 7.5-325 mg per tablet Take 2 Tabs by mouth every four (4) hours as needed. Max Daily Amount: 12 Tabs. 20 Tab 0    glipiZIDE SR (GLUCOTROL) 2.5 mg CR tablet Take 1 Tab by mouth daily. 30 Tab 0    albuterol-ipratropium (DUO-NEB) 2.5 mg-0.5 mg/3 ml nebu 3 mL by Nebulization route.  amylase-lipase-protease (CREON) 12,000-38,000 -60,000 unit capsule Take 1 Cap by mouth three (3) times daily (with meals). (Patient taking differently: Take  by mouth daily.) 90 Cap 0    sevelamer carbonate (RENVELA) 800 mg tab tab Take 3 Tabs by mouth three (3) times daily (with meals). 90 Tab 0    LORazepam (ATIVAN) 0.5 mg tablet Take 1 Tab by mouth two (2) times daily as needed for Anxiety. Max Daily Amount: 1 mg. 30 Tab 0    aspirin delayed-release 81 mg tablet Take  by mouth daily.  albuterol (PROVENTIL HFA, VENTOLIN HFA) 90 mcg/actuation inhaler Take 2 Puffs by inhalation every six (6) hours as needed.       ondansetron hcl (ZOFRAN) 4 mg tablet Take 4 mg by mouth every eight (8) hours as needed.  oxyCODONE IR (ROXICODONE) 15 mg immediate release tablet Take 15 mg by mouth two (2) times daily as needed. Allergies  Allergies   Allergen Reactions    Contrast Agent [Iodine] Angioedema    Heparin Analogues Other (comments)     Thrombocytopenia, positive HIT panel (5/15/15)    Prednisone Other (comments)     Became comatosed       Family History  Family History   Problem Relation Age of Onset    Hypertension Mother     Heart Disease Mother     Stroke Father     Heart Disease Father     Heart Disease Maternal Aunt     Diabetes Maternal Aunt        Social History  Social History     Social History    Marital status: LEGALLY      Spouse name: N/A    Number of children: N/A    Years of education: N/A     Occupational History    Not on file.      Social History Main Topics    Smoking status: Former Smoker     Packs/day: 1.00     Years: 19.00     Types: Cigarettes     Quit date: 1/1/2003    Smokeless tobacco: Never Used    Alcohol use No    Drug use: No    Sexual activity: Not on file     Other Topics Concern    Not on file     Social History Narrative       Problem List  Patient Active Problem List   Diagnosis Code    Chronic pain syndrome G89.4    Exocrine pancreatic insufficiency K86.81    Lupus ([facility] M32.9    Arthralgia M25.50    Pancreas cyst K86.2    Mitral valve disorders I05.9    Cardiomegaly I51.7    Chronic diastolic heart failure (HCC) I50.32    ESRD (end stage renal disease) on dialysis (HCC) N18.6, Z99.2    Acute respiratory failure (HCC) J96.00    Pulmonary hypertension (HCC) I27.2    Anemia of chronic kidney failure N18.9, D63.1    Benign hypertension with ESRD (end-stage renal disease) (HCC) I12.0, N18.6    Heparin induced thrombocytopenia (HCC) D75.82    Accelerated hypertension I10    Cellulitis and abscess of upper arm and forearm [id number]    Exercise hypoxemia R09.02    Chest pain R07.9    Pre-op evaluation Z01.818    SLE (systemic lupus erythematosus related syndrome) ([facility]) M32.9    Pericardial effusion I31.3    Mitral regurgitation I34.0    ESRD (end stage renal disease) ([facility]) N18.6    COPD mixed type ([facility]) J44.9       Review of Systems  Review of Systems   Constitutional: Negative for chills and fever. Respiratory: Negative for shortness of breath. Cardiovascular: Negative for chest pain. Gastrointestinal: Positive for nausea and vomiting. Negative for abdominal pain and diarrhea. Skin: Negative for itching and rash. Vital Signs  Vitals:    02/20/17 1150   BP: (!) 134/91   Pulse: 82   Resp: 16   Temp: 98.2 °F (36.8 °C)   TempSrc: Oral   SpO2: 97%   Weight: 145 lb (65.8 kg)   Height: 5' 8\" (1.727 m)   PainSc:   0 - No pain       Physical Exam  Physical Exam   Constitutional: She is oriented to person, place, and time. HENT:   Right Ear: External ear normal.   Left Ear: External ear normal.   Mouth/Throat: Oropharynx is clear and moist.   Neck: Normal range of motion. Neck supple. Cardiovascular: Normal rate, regular rhythm, normal heart sounds and intact distal pulses. No murmur heard. Pulmonary/Chest: Effort normal and breath sounds normal. No respiratory distress. She has no wheezes. Abdominal: Soft. Bowel sounds are normal. There is no tenderness. Genitourinary: Vagina normal and uterus normal. There is no rash or tenderness on the right labia. There is lesion on the left labia. There is no rash or tenderness on the left labia. Cervix exhibits no motion tenderness and no discharge. Genitourinary Comments: Round intact nodular lesion on left side of labia major. Non-tender to touch. No redness. No erythema. No swelling. Musculoskeletal: Normal range of motion. Neurological: She is alert and oriented to person, place, and time. Skin: Skin is warm and dry. Psychiatric: She has a normal mood and affect.  Her behavior is normal. Judgment and thought content normal.       Diagnostics  Orders Placed This Encounter    PAP, IG, RFX HPV ASCUS ([id number])     Standing Status:   Future     Standing Expiration Date:   8/20/2017     Order Specific Question:   Pap Source? Answer:   Cervical and Endocervical     Order Specific Question:   Total Hysterectomy? Answer:   No     Order Specific Question:   Supracervical Hysterectomy? Answer:   No     Order Specific Question:   Post Menopausal?     Answer:   No     Order Specific Question:   Hormone Therapy? Answer:   No     Order Specific Question:   IUD? Answer:   No     Order Specific Question:   Abnormal Bleeding? Answer:   No     Order Specific Question:   Pregnant     Answer:   No     Order Specific Question:   Post Partum? Answer:   No       Results  Results for orders placed or performed during the hospital encounter of 11/22/16   CBC WITH AUTOMATED DIFF   Result Value Ref Range    WBC 6.6 4.6 - 13.2 K/uL    RBC 4.01 (L) 4.20 - 5.30 M/uL    HGB 10.1 (L) 12.0 - 16.0 g/dL    HCT 33.1 (L) 35.0 - 45.0 %    MCV 82.5 74.0 - 97.0 FL    MCH 25.2 24.0 - 34.0 PG    MCHC 30.5 (L) 31.0 - 37.0 g/dL    RDW 18.3 (H) 11.6 - 14.5 %    PLATELET 248 309 - 294 K/uL    MPV 10.0 9.2 - 11.8 FL    NEUTROPHILS 76 (H) 40 - 73 %    LYMPHOCYTES 12 (L) 21 - 52 %    MONOCYTES 9 3 - 10 %    EOSINOPHILS 3 0 - 5 %    BASOPHILS 0 0 - 2 %    ABS. NEUTROPHILS 5.1 1.8 - 8.0 K/UL    ABS. LYMPHOCYTES 0.8 (L) 0.9 - 3.6 K/UL    ABS. MONOCYTES 0.6 0.05 - 1.2 K/UL    ABS. EOSINOPHILS 0.2 0.0 - 0.4 K/UL    ABS.  BASOPHILS 0.0 0.0 - 0.06 K/UL    DF AUTOMATED     SED RATE (ESR)   Result Value Ref Range    Sed rate, automated 90 (H) 0 - 20 mm/hr   T4, FREE   Result Value Ref Range    T4, Free 0.8 0.7 - 1.5 NG/DL   IRON PROFILE   Result Value Ref Range    Iron 37 (L) 50 - 175 ug/dL    TIBC 197 (L) 250 - 450 ug/dL    Iron % saturation 19 %   LIPID PANEL   Result Value Ref Range    LIPID PROFILE          Cholesterol, total 120 <200 MG/DL    Triglyceride 205 (H) <150 MG/DL    HDL Cholesterol 60 40 - 60 MG/DL    LDL, calculated 19 0 - 100 MG/DL    VLDL, calculated 41 MG/DL    CHOL/HDL Ratio 2.0 0 - 5.0     MAGNESIUM   Result Value Ref Range    Magnesium 2.0 1.8 - 2.4 mg/dL   METABOLIC PANEL, COMPREHENSIVE   Result Value Ref Range    Sodium 134 (L) 136 - 145 mmol/L    Potassium 4.0 3.5 - 5.5 mmol/L    Chloride 95 (L) 100 - 108 mmol/L    CO2 29 21 - 32 mmol/L    Anion gap 10 3.0 - 18 mmol/L    Glucose 109 (H) 74 - 99 mg/dL    BUN 33 (H) 7.0 - 18 MG/DL    Creatinine 3.98 (H) 0.6 - 1.3 MG/DL    BUN/Creatinine ratio 8 (L) 12 - 20      GFR est AA 15 (L) >60 ml/min/1.73m2    GFR est non-AA 12 (L) >60 ml/min/1.73m2    Calcium 9.5 8.5 - 10.1 MG/DL    Bilirubin, total 0.3 0.2 - 1.0 MG/DL    ALT (SGPT) 29 13 - 56 U/L    AST (SGOT) 28 15 - 37 U/L    Alk. phosphatase 251 (H) 45 - 117 U/L    Protein, total 10.0 (H) 6.4 - 8.2 g/dL    Albumin 3.2 (L) 3.4 - 5.0 g/dL    Globulin 6.8 (H) 2.0 - 4.0 g/dL    A-G Ratio 0.5 (L) 0.8 - 1.7     PHOSPHORUS   Result Value Ref Range    Phosphorus 5.6 (H) 2.5 - 4.9 MG/DL   TSH, 3RD GENERATION   Result Value Ref Range    TSH 0.96 0.36 - 3.74 uIU/mL   VITAMIN D, 25 HYDROXY   Result Value Ref Range    Vitamin D 25-Hydroxy 11.6 (L) 30 - 100 ng/mL         Assessment and Plan  Casi Valencia was seen today for well woman. Diagnoses and all orders for this visit:    Well woman exam with routine gynecological exam  -     PAP, IG, RFX HPV ASCUS ([id number]); Future    Lesion of labia    Patient reports nodular lesion on labia is chronic and has been present for almost 20 years. Offered gyn referral. Patient undecided as area is chronic and is not causing her any pain, itch, or discomfort. After care summary printed and reviewed with patient. Plan reviewed with patient. Questions answered. Patient verbalized understanding of plan and is in agreement with plan. Patient to follow up with Dr. Lynne Calhoun as scheduled or earlier if symptoms worsen.      Karin Gallardo, CLARAP-C

## 2017-02-20 NOTE — MR AVS SNAPSHOT
Visit Information Date & Time Provider Department Dept. Phone Encounter #  
 2/20/2017 11:30 AM Marizol Arreguin NP [address] [id number] Follow-up Instructions Return if symptoms worsen or fail to improve. Your Appointments 2/22/2017  1:15 PM  
Follow [place] [address] [place] [address] (--) Appt Note: juliana Phelps [address] [address]  
  
    
 7/26/2017  1:15 PM  
Follow Up with Fabian Stanford MD  
[facility] ([facility]) Appt Note: 6 month follow up  
 [address] [facility] hospital [address]  
  
   
 [address] [place] [id number] Upcoming Health Maintenance Date Due Pneumococcal 19-64 Highest Risk (1 of 3 - PCV13) 7/1/1986 DTaP/Tdap/Td series (1 - Tdap) 7/1/1988 PAP AKA CERVICAL CYTOLOGY 7/1/1988 INFLUENZA AGE 9 TO ADULT 8/1/2016 Allergies as of 2/20/2017  Review Complete On: 7/88/4093 By: Mindi Taylor. Kaci Molina LPN Severity Noted Reaction Type Reactions Contrast Agent [Iodine] High 05/13/2015    Angioedema Heparin Analogues High 05/20/2015   Side Effect Other (comments) Thrombocytopenia, positive HIT panel (5/15/15) Prednisone  05/13/2015    Other (comments) Became comatosed Current Immunizations  Reviewed on 5/17/2015 No immunizations on file. Not reviewed this visit Vitals BP Pulse Temp Resp Height(growth percentile) Weight(growth percentile) (!) 134/91 (BP 1 Location: Right arm, BP Patient Position: Sitting) 82 98.2 °F (36.8 °C) (Oral) 16 5' 8\" (1.727 m) 145 lb (65.8 kg) SpO2 BMI OB Status Smoking Status 97% 22.05 kg/m2 Medically Induced Former Smoker BMI and BSA Data Body Mass Index Body Surface Area 22.05 kg/m 2 1.78 m 2 Preferred Pharmacy Pharmacy Name Phone [phone] [facility], [address] Your Updated Medication List  
  
   
This list is accurate as of: 2/20/17 12:22 PM.  Always use your most recent med list.  
  
  
  
  
 albuterol 90 mcg/actuation inhaler Commonly known as:  PROVENTIL HFA, VENTOLIN HFA, PROAIR HFA Take 2 Puffs by inhalation every six (6) hours as needed. albuterol-ipratropium 2.5 mg-0.5 mg/3 ml Nebu Commonly known as:  DUO-NEB  
3 mL by Nebulization route. amLODIPine 10 mg tablet Commonly known as:  NORVASC  
take 1 tablet by mouth once daily  
  
 amylase-lipase-protease 12,000-38,000 -60,000 unit capsule Commonly known as:  CREON Take 1 Cap by mouth three (3) times daily (with meals). aspirin delayed-release 81 mg tablet Take  by mouth daily. Blood-Glucose Meter monitoring kit Check blood fasting blood sugar and also after largest meal of the day CARAFATE 100 mg/mL suspension Generic drug:  sucralfate TAKE 10 ML BY MOUTH FOUR TIMES DAILY BEFORE MEALS AND AT BEDTIME  
  
 carvedilol 25 mg tablet Commonly known as:  Tito Lawman Take 1 Tab by mouth two (2) times daily (with meals). glipiZIDE SR 2.5 mg CR tablet Commonly known as:  GLUCOTROL XL Take 1 Tab by mouth daily. glucose blood VI test strips strip Commonly known as:  blood glucose test  
Check Fasting Blood Sugar and after largest meal of the day  
  
 lancets 32 gauge Misc  
100 Each by Does Not Apply route two (2) times daily (with meals). lisinopril 5 mg tablet Commonly known as:  PRINIVIL, ZESTRIL  
take 1 tablet by mouth once daily LORazepam 0.5 mg tablet Commonly known as:  ATIVAN Take 1 Tab by mouth two (2) times daily as needed for Anxiety. Max Daily Amount: 1 mg.  
  
 oxyCODONE-acetaminophen 7.5-325 mg per tablet Commonly known as:  PERCOCET 7.5 Take 2 Tabs by mouth every four (4) hours as needed. Max Daily Amount: 12 Tabs. PHOSLO 667 mg Cap Generic drug:  calcium acetate Take  by mouth three (3) times daily (with meals). promethazine 25 mg tablet Commonly known as:  PHENERGAN Take 1 Tab by mouth every six (6) hours as needed for Nausea. MIROSLAVA-IAIN PO Take  by mouth. ROXICODONE 15 mg immediate release tablet Generic drug:  oxyCODONE IR Take 15 mg by mouth two (2) times daily as needed. sevelamer carbonate 800 mg Tab tab Commonly known as:  Eric Boehringer Take 3 Tabs by mouth three (3) times daily (with meals). umeclidinium-vilanterol 62.5-25 mcg/actuation inhaler Commonly known as:  Paul Campanile Take 1 Puff by inhalation daily. Indications: BRONCHOSPASM PREVENTION WITH COPD  
  
 ZOFRAN (AS HYDROCHLORIDE) 4 mg tablet Generic drug:  ondansetron hcl Take 4 mg by mouth every eight (8) hours as needed. Follow-up Instructions Return if symptoms worsen or fail to improve. Patient Instructions Please contact our office if you have any questions about your visit today. Pap Test: Care Instructions Your Care Instructions The Pap test (also called a Pap smear) is a screening test for cancer of the cervix, which is the lower part of the uterus that opens into the vagina. The test can help your doctor find early changes in the cells that could lead to cancer. The sample of cells taken during your test has been sent to a lab so that an expert can look at the cells. It usually takes a week or two to get the results back. Follow-up care is a key part of your treatment and safety. Be sure to make and go to all appointments, and call your doctor if you are having problems. It's also a good idea to know your test results and keep a list of the medicines you take. What do the results mean? · A normal result means that the test did not find any abnormal cells in the sample. · An abnormal result can mean many things. Most of these are not cancer. The results of your test may be abnormal because: 
¨ You have an infection of the vagina or cervix, such as a yeast infection. ¨ You have an IUD (intrauterine device for birth control). ¨ You have low estrogen levels after menopause that are causing the cells to change. ¨ You have cell changes that may be a sign of precancer or cancer. The results are ranked based on how serious the changes might be. There are many other reasons why you might not get a normal result. If the results were abnormal, you may need to get another test within a few weeks or months. If the results show changes that could be a sign of cancer, you may need a test called a colposcopy, which provides a more complete view of the cervix. Sometimes the lab cannot use the sample because it does not contain enough cells or was not preserved well. If so, you may need to have the test again. This is not common, but it does happen from time to time. When should you call for help? Watch closely for changes in your health, and be sure to contact your doctor if: 
· You have vaginal bleeding or pain for more than 2 days after the test. It is normal to have a small amount of bleeding for a day or two after the test. 
Where can you learn more? Go to http://romeo-mónica.info/. Enter X203 in the search box to learn more about \"Pap Test: Care Instructions. \" Current as of: July 26, 2016 Content Version: 11.1 © 7863-4231 [employer]. Care instructions adapted under license by [employer] (which disclaims liability or warranty for this information). If you have questions about a medical condition or this instruction, always ask your healthcare professional. [place] [address] any warranty or liability for your use of this information. Please provide this summary of care documentation to your next provider. Your primary care clinician is listed as [id number] [facility].  If you have any questions after today's visit, please call [phone].

## 2017-02-22 NOTE — PROGRESS NOTES
History and Physical    Patient: David Power MRN: [id number]  SSN: [id number]    YOB: 1965  Age: 54 y.o. Sex: female      Subjective:      David Power is a 54 y.o. female who is here for follow up. Patient mentions that her left arm fistula was infiltrated about two weeks ago but it is accessible. Patient states that her pap went well. She mentions that she did see Dr. Jayde Henderson, GI, and she had an ultrasound of pancreas which showed enlarging pseudo-cysts. She states that she will see a specialist up in [place] for this. She mentions that her blood sugars can drop to 40 once or twice a week---she takes glucose tablets to bring her blood sugar up. The patient mentions that she uses Anoro once or twice a week--she does have the albuterol but does not mention over-use of this. She states that she has been feeling feverish from time to time. Past Medical History:   Diagnosis Date    Cardiomegaly 10/1/2013    Chronic diastolic heart failure ([address] 10/1/2013    sob on activity     Chronic diastolic heart failure (HCC)     Chronic kidney disease     dialysis    Chronic obstructive pulmonary disease ([address] 10/1/2013    Chronic pain     Cirrhosis ([address]     Dialysis patient ([address] 12/17/2012    ESRD (end stage renal disease) ([address]     ESRD (end stage renal disease) ([address] 7/27/2016    HD since 9/11     Essential hypertension, benign 10/1/2013    severly elevated     Essential hypertension, benign 10/1/2013    severly elevated     Hypertension     Lupus ([address]     Mitral valve disorders 10/1/2013    Mitral valve disorders 10/1/2013    On home oxygen therapy     3 liters /min NC    Pancreatitis     Pancreatitis chronic     Precordial pain     Stroke ([address]     1998    Tricuspid valve disorders, specified as nonrheumatic 10/1/2013   SLE diagnosed in 2012 by Dr. Wei Eastman      Review of Systems:  Pertinent items are noted in the History of Present Illness.     Objective:     Visit Vitals    BP 146/90 (BP 1 Location: Right arm, BP Patient Position: Sitting)    Pulse 86    Temp 98 °F (36.7 °C) (Oral)    Ht 5' 8\" (1.727 m)    Wt 142 lb (64.4 kg)    BMI 21.59 kg/m2         Physical Exam:  GENERAL: alert, cooperative, no distress, appears stated age  EYE: conjunctivae/corneas clear. PERRL, EOM's intact. Fundi benign  LYMPHATIC: Cervical, supraclavicular, and axillary nodes normal.   THROAT & NECK: normal, no erythema or exudates noted. and poor dentition   LUNG: clear to auscultation bilaterally  HEART: regular rate and rhythm, S1, S2 normal, no murmur, click, rub or gallop  ABDOMEN: soft, non-tender. Bowel sounds normal. No masses,  no organomegaly, abnormal findings:  Mildly obese  EXTREMITIES:  Fistulae noted in both arms bilaterally. Hematoma noted above fistula in left arm---warm to touch. SKIN: Multiple scars on upper extremity from previous fistulae       Labs:     Lab Results   Component Value Date/Time    Vitamin D 25-Hydroxy 11.6 11/22/2016 01:39 PM           Lab Results   Component Value Date/Time    WBC 6.6 11/22/2016 01:39 PM    HGB 10.1 11/22/2016 01:39 PM    HCT 33.1 11/22/2016 01:39 PM    PLATELET 515 20/56/6754 01:39 PM    MCV 82.5 11/22/2016 01:39 PM     Lab Results   Component Value Date/Time    Sodium 134 11/22/2016 01:39 PM    Potassium 4.0 11/22/2016 01:39 PM    Chloride 95 11/22/2016 01:39 PM    CO2 29 11/22/2016 01:39 PM    Anion gap 10 11/22/2016 01:39 PM    Glucose 109 11/22/2016 01:39 PM    BUN 33 11/22/2016 01:39 PM    Creatinine 3.98 11/22/2016 01:39 PM    BUN/Creatinine ratio 8 11/22/2016 01:39 PM    GFR est AA 15 11/22/2016 01:39 PM    GFR est non-AA 12 11/22/2016 01:39 PM    Calcium 9.5 11/22/2016 01:39 PM    AST (SGOT) 28 11/22/2016 01:39 PM    Alk.  phosphatase 251 11/22/2016 01:39 PM    Protein, total 10.0 11/22/2016 01:39 PM    Albumin 3.2 11/22/2016 01:39 PM    Globulin 6.8 11/22/2016 01:39 PM    A-G Ratio 0.5 11/22/2016 01:39 PM    ALT (SGPT) 29 11/22/2016 01:39 PM Assessment:     1.) Vitamin D Deficiency: Patient ordered Vitamin D 50,000 units once a week. 2.) Exocrine Pancreatic Insufficiency w/ history of pancreatic cyst: Patient will continue using glucometer and test strips ordered to assess for any hypoglycemic episodes. She will continue to follow with Dr. Beronica Cross. 3.) End Stage Renal Disease on Hemodialysis: Patient will continue on dialysis M, W, F.     4.) COPD: Patient advised on the proper use of Anoro and albuterol. 5.) Essential Hypertension: Multifactorial in nature. Patient will continue on current regimen. 6.) Systemic Lupus Erythematosus: Patient will continue to follow with Dr. Cosmo Mulligan. 7.) Febrile Episodes: Likely secondary to fistula infiltration. CBC w/ diff drawn in the office today to rule out infectious causes. 8.) Anemia of Chronic Disease: CBC w/ diff drawn in the office today. Patient will continue to follow with her nephrologist    Patient will return in 3 months for follow up.     Plan:     Orders Placed This Encounter    CBC WITH AUTOMATED DIFF    ergocalciferol (ERGOCALCIFEROL) 50,000 unit capsule             Signed By: [id number] Parth Yañez,      February 22, 2017

## 2017-02-22 NOTE — PROGRESS NOTES
Contacted Pt regarding previous note. Informed Pt of previous note. Pt verbalized understanding. Pt would like to be referred to gynecology.

## 2017-02-22 NOTE — PATIENT INSTRUCTIONS
1. ) We will  get labs on next visit. Please come fasting.     2.) Return in 3 months for follow up     3.) Use Anoro (inhaler) once a day. Albuterol is only for emergencies. 4.) Use Vitamin D once a week.

## 2017-02-22 NOTE — MR AVS SNAPSHOT
Visit Information Date & Time Provider Department Dept. Phone Encounter #  
 2/22/2017  8:00 AM Jenna Araujo 77 [id number] Follow-up Instructions Return in about 3 months (around 5/22/2017) for Regular Follow Up. Give earliest appointment  . Your Appointments 7/26/2017  1:15 PM  
Follow Up with Fabian Stanford MD  
[facility] ([id number]) Appt Note: 6 month follow up  
 [address] [facility] [address]  
  
   
 [address] [place] [id number] Upcoming Health Maintenance Date Due DTaP/Tdap/Td series (1 - Tdap) 7/1/1988 Pneumococcal 19-64 Highest Risk (2 of 3 - PCV13) 1/16/2016 PAP AKA CERVICAL CYTOLOGY 2/20/2020 Allergies as of 2/22/2017  Review Complete On: 2/22/2017 By: Raudel Robbins LPN Severity Noted Reaction Type Reactions Contrast Agent [Iodine] High 05/13/2015    Angioedema Heparin Analogues High 05/20/2015   Side Effect Other (comments) Thrombocytopenia, positive HIT panel (5/15/15) Prednisone  05/13/2015    Other (comments) Became comatosed Current Immunizations  Reviewed on 5/17/2015 No immunizations on file. Not reviewed this visit You Were Diagnosed With   
  
 Codes Comments ESRD (end stage renal disease) ([facility]    -  Primary ICD-10-CM: N18.6 ICD-9-CM: 526. 6 Exocrine pancreatic insufficiency     ICD-10-CM: K86.81 
ICD-9-CM: 577.8 Vitamin D deficiency     ICD-10-CM: E55.9 ICD-9-CM: 268.9 Chronic pain syndrome     ICD-10-CM: G89.4 ICD-9-CM: 338.4 COPD mixed type [facility])     ICD-10-CM: J44.9 ICD-9-CM: 512 Pulmonary hypertension ([facility]     ICD-10-CM: I27.2 ICD-9-CM: 416.8 Fever in other diseases     ICD-10-CM: R50.81 ICD-9-CM: 780.61 Vitals BP  
  
  
  
  
  
 146/90 (BP 1 Location: Right arm, BP Patient Position: Sitting) BMI and BSA Data Body Mass Index Body Surface Area  
 21.59 kg/m 2 1.76 m 2 Preferred Pharmacy Pharmacy Name Phone [phone] [facility], [address] Your Updated Medication List  
  
   
This list is accurate as of: 2/22/17  8:42 AM.  Always use your most recent med list.  
  
  
  
  
 albuterol 90 mcg/actuation inhaler Commonly known as:  PROVENTIL HFA, VENTOLIN HFA, PROAIR HFA Take 2 Puffs by inhalation every six (6) hours as needed. albuterol-ipratropium 2.5 mg-0.5 mg/3 ml Nebu Commonly known as:  DUO-NEB  
3 mL by Nebulization route. amLODIPine 10 mg tablet Commonly known as:  NORVASC  
take 1 tablet by mouth once daily  
  
 amylase-lipase-protease 12,000-38,000 -60,000 unit capsule Commonly known as:  CREON Take 1 Cap by mouth three (3) times daily (with meals). aspirin delayed-release 81 mg tablet Take  by mouth daily. Blood-Glucose Meter monitoring kit Check blood fasting blood sugar and also after largest meal of the day CARAFATE 100 mg/mL suspension Generic drug:  sucralfate TAKE 10 ML BY MOUTH FOUR TIMES DAILY BEFORE MEALS AND AT BEDTIME  
  
 carvedilol 25 mg tablet Commonly known as:  Kathleene Rupal Take 1 Tab by mouth two (2) times daily (with meals). ergocalciferol 50,000 unit capsule Commonly known as:  ERGOCALCIFEROL Take 1 Cap by mouth every seven (7) days. glipiZIDE SR 2.5 mg CR tablet Commonly known as:  GLUCOTROL XL Take 1 Tab by mouth daily. glucose blood VI test strips strip Commonly known as:  blood glucose test  
Check Fasting Blood Sugar and after largest meal of the day  
  
 lancets 32 gauge Misc  
100 Each by Does Not Apply route two (2) times daily (with meals). lisinopril 5 mg tablet Commonly known as:  PRINIVIL, ZESTRIL  
take 1 tablet by mouth once daily LORazepam 0.5 mg tablet Commonly known as:  ATIVAN  
 Take 1 Tab by mouth two (2) times daily as needed for Anxiety. Max Daily Amount: 1 mg.  
  
 oxyCODONE-acetaminophen 7.5-325 mg per tablet Commonly known as:  PERCOCET 7.5 Take 2 Tabs by mouth every four (4) hours as needed. Max Daily Amount: 12 Tabs. PHOSLO 667 mg Cap Generic drug:  calcium acetate Take  by mouth three (3) times daily (with meals). promethazine 25 mg tablet Commonly known as:  PHENERGAN Take 1 Tab by mouth every six (6) hours as needed for Nausea. MIROSLAVA-IAIN PO Take  by mouth. ROXICODONE 15 mg immediate release tablet Generic drug:  oxyCODONE IR Take 15 mg by mouth two (2) times daily as needed. sevelamer carbonate 800 mg Tab tab Commonly known as:  Riccardo Hurry Take 3 Tabs by mouth three (3) times daily (with meals). umeclidinium-vilanterol 62.5-25 mcg/actuation inhaler Commonly known as:  Mario Call Take 1 Puff by inhalation daily. Indications: BRONCHOSPASM PREVENTION WITH COPD  
  
 ZOFRAN (AS HYDROCHLORIDE) 4 mg tablet Generic drug:  ondansetron hcl Take 4 mg by mouth every eight (8) hours as needed. Prescriptions Sent to Pharmacy Refills  
 ergocalciferol (ERGOCALCIFEROL) 50,000 unit capsule 3 Sig: Take 1 Cap by mouth every seven (7) days. Class: Normal  
 Pharmacy: [address] [place] [address], [address] [place] [address] #: [phone] Route: Oral  
  
Follow-up Instructions Return in about 3 months (around 5/22/2017) for Regular Follow Up. Give earliest appointment  . To-Do List   
 Around 05/23/2017 Lab:  CBC WITH AUTOMATED DIFF Patient Instructions 1.) We will  get labs on next visit. Please come fasting.  
 
2.) Return in 3 months for follow up  
 
3.) Use Anoro (inhaler) once a day. Albuterol is only for emergencies. 4.) Use Vitamin D once a week. Please provide this summary of care documentation to your next provider. Your primary care clinician is listed as Tariq Atkinson. If you have any questions after today's visit, please call [phone].

## 2017-02-22 NOTE — PROGRESS NOTES
1. Have you been to the ER, urgent care clinic since your last visit? Hospitalized since your last visit? No    2. Have you seen or consulted any other health care providers outside of the [address] [place] [address] since your last visit? Include any pap smears or colon screening.  No    Is someone accompanying this pt? no    Is the patient using any DME equipment during OV? no      Chief Complaint   Patient presents with    Hypertension    COPD    Lupus    Diabetes

## 2017-02-22 NOTE — PROGRESS NOTES
Please contact patient and let her know that her PAP was normal. Reoffer her a gyn referral for labial lesion if she is interested.

## 2017-02-24 NOTE — DISCHARGE INSTRUCTIONS
Hematoma: Care Instructions  Your Care Instructions  A hematoma is a bad bruise. It happens when an injury causes blood to collect and pool under the skin. The pooling blood gives the skin a spongy, rubbery, lumpy feel. A hematoma usually is not a cause for concern. It is not the same thing as a blood clot in a vein, and it does not cause blood clots. Follow-up care is a key part of your treatment and safety. Be sure to make and go to all appointments, and call your doctor if you are having problems. It's also a good idea to know your test results and keep a list of the medicines you take. How can you care for yourself at home? · Rest and protect the bruised area. · Put ice or a cold pack on the area for 10 to 20 minutes at a time. · Prop up the bruised area on a pillow when you ice it or anytime you sit or lie down during the next 3 days. Try to keep it above the level of your heart. This will help reduce swelling. · Wrapping the bruised area with an elastic bandage such as an Ace wrap will help decrease swelling. Don't wrap it too tightly, as this can cause more swelling below the affected area. · Take an over-the-counter pain medicine, such as acetaminophen (Tylenol), ibuprofen (Advil, Motrin), or naproxen (Aleve). · Do not take two or more pain medicines at the same time unless the doctor told you to. Many pain medicines have acetaminophen, which is Tylenol. Too much acetaminophen (Tylenol) can be harmful. When should you call for help? Call your doctor now or seek immediate medical care if:  · You have signs of skin infection, such as:  ¨ Increased pain, swelling, warmth, or redness. ¨ Red streaks leading from the area. ¨ Pus draining from the area. ¨ A fever. Watch closely for changes in your health, and be sure to contact your doctor if:  · The bruise lasts longer than 4 weeks. · The bruise gets bigger or becomes more painful. · You do not get better as expected.   Where can you learn more?  Go to http://romeo-mónica.info/. Enter P911 in the search box to learn more about \"Hematoma: Care Instructions. \"  Current as of: May 27, 2016  Content Version: 11.1  © 8860-0905 [employer], [employer]. Care instructions adapted under license by [employer] (which disclaims liability or warranty for this information). If you have questions about a medical condition or this instruction, always ask your healthcare professional. [address] any warranty or liability for your use of this information.

## 2017-02-24 NOTE — ED NOTES
Vitals:  Patient Vitals for the past 12 hrs:   Temp Pulse Resp BP SpO2   02/24/17 0621 98.1 °F (36.7 °C) 79 18 140/90 98 %   Pulsox interpreted within normal limits. Medications ordered:   Medications - No data to display        X-Ray, CT or other radiology findings or impressions:  CT HUMERUS LT WO CONT   IMPRESSION:     In the upper anterior portion of left arm, above the midpoint of this arm, in  subcutaneous location, there is mild to moderate hyperdense collection likely to  be hematoma measuring 4.4 cm x 3.4 cm x 5 cm, with surrounding significant edema  as described. In most of rest of subcutaneous fatty tissues of left ulnar  evidence of diffuse edema except the posterior aspect. Per Radiologist            Progress notes, Consult notes or additional Procedure notes:   9:32 AM Pt reevaluated at this time and is resting comfortably in NAD. Discussed results and findings, as well as, diagnosis and plan for discharge. Pt verbalizes understanding and agreement with plan. All questions addressed at this time. Disposition:  Diagnosis: Hematoma of left arm    Disposition: discharged    F/U with PCP or nephrologist in 24 hours for further evaluation. Patient's Medications   Start Taking    No medications on file   Continue Taking    ALBUTEROL (PROVENTIL HFA, VENTOLIN HFA) 90 MCG/ACTUATION INHALER    Take 2 Puffs by inhalation every six (6) hours as needed. AMLODIPINE (NORVASC) 10 MG TABLET    take 1 tablet by mouth once daily    AMYLASE-LIPASE-PROTEASE (CREON) 12,000-38,000 -60,000 UNIT CAPSULE    Take 1 Cap by mouth three (3) times daily (with meals). ASPIRIN DELAYED-RELEASE 81 MG TABLET    Take  by mouth daily.     BLOOD-GLUCOSE METER MONITORING KIT    Check blood fasting blood sugar and also after largest meal of the day    CARAFATE 100 MG/ML SUSPENSION    TAKE 10 ML BY MOUTH FOUR TIMES DAILY BEFORE MEALS AND AT BEDTIME    CARVEDILOL (COREG) 25 MG TABLET    Take 1 Tab by mouth two (2) times daily (with meals). ERGOCALCIFEROL (ERGOCALCIFEROL) 50,000 UNIT CAPSULE    Take 1 Cap by mouth every seven (7) days. FOLIC ACID/VIT BCOMP,C (MIROSLAAV-IAIN PO)    Take  by mouth. GLUCOSE BLOOD VI TEST STRIPS (BLOOD GLUCOSE TEST) STRIP    Check Fasting Blood Sugar and after largest meal of the day    LANCETS 32 GAUGE MISC    100 Each by Does Not Apply route two (2) times daily (with meals). LORAZEPAM (ATIVAN) 0.5 MG TABLET    Take 1 Tab by mouth two (2) times daily as needed for Anxiety. Max Daily Amount: 1 mg. ONDANSETRON HCL (ZOFRAN) 4 MG TABLET    Take 4 mg by mouth every eight (8) hours as needed. OXYCODONE IR (ROXICODONE) 15 MG IMMEDIATE RELEASE TABLET    Take 15 mg by mouth two (2) times daily as needed. PROMETHAZINE (PHENERGAN) 25 MG TABLET    Take 1 Tab by mouth every six (6) hours as needed for Nausea. SEVELAMER CARBONATE (RENVELA) 800 MG TAB TAB    Take 3 Tabs by mouth three (3) times daily (with meals). UMECLIDINIUM-VILANTEROL (ANORO ELLIPTA) 62.5-25 MCG/ACTUATION INHALER    Take 1 Puff by inhalation daily. Indications: BRONCHOSPASM PREVENTION WITH COPD   These Medications have changed    No medications on file   Stop Taking    CALCIUM ACETATE (PHOSLO) 667 MG CAP    Take  by mouth three (3) times daily (with meals). SCRIBE ATTESTATION STATEMENT  Documented by: Jeanmarie doyleing for, and in the presence of,  Jb Salazar MD       Signed by: Lilliana Knutson, 02/24/17, 7:17 AM        PROVIDER ATTESTATION STATEMENT  I personally performed the services described in the documentation, reviewed the documentation, as recorded by the scribe in my presence, and it accurately and completely records my words and actions.     Jb Salazar MD

## 2017-02-24 NOTE — ED NOTES
Pt states ready for discharge. Pt states she will follow up with PCP as instructed by provider. Pt appears in NOAD. I have reviewed discharge instructions with the patient. The patient verbalized understanding. Patient seen leaving ED ambulatory without difficulty or need for assistance, with S/O in no sign of distress. Patient armband removed and shredded. Current Discharge Medication List      CONTINUE these medications which have NOT CHANGED    Details   ergocalciferol (ERGOCALCIFEROL) 50,000 unit capsule Take 1 Cap by mouth every seven (7) days. Qty: 12 Cap, Refills: 3    Associated Diagnoses: Vitamin D deficiency      amLODIPine (NORVASC) 10 mg tablet take 1 tablet by mouth once daily  Qty: 30 Tab, Refills: 6      CARAFATE 100 mg/mL suspension TAKE 10 ML BY MOUTH FOUR TIMES DAILY BEFORE MEALS AND AT BEDTIME  Qty: 240 mL, Refills: 0      promethazine (PHENERGAN) 25 mg tablet Take 1 Tab by mouth every six (6) hours as needed for Nausea. Qty: 12 Tab, Refills: 3    Associated Diagnoses: Exocrine pancreatic insufficiency      Blood-Glucose Meter monitoring kit Check blood fasting blood sugar and also after largest meal of the day  Qty: 1 Kit, Refills: 0    Associated Diagnoses: Exocrine pancreatic insufficiency      glucose blood VI test strips (BLOOD GLUCOSE TEST) strip Check Fasting Blood Sugar and after largest meal of the day  Qty: 100 Strip, Refills: 3    Associated Diagnoses: Exocrine pancreatic insufficiency      lancets 32 gauge misc 100 Each by Does Not Apply route two (2) times daily (with meals). Qty: 100 Lancet, Refills: 1    Associated Diagnoses: Exocrine pancreatic insufficiency      carvedilol (COREG) 25 mg tablet Take 1 Tab by mouth two (2) times daily (with meals). Qty: 60 Tab, Refills: 6      umeclidinium-vilanterol (ANORO ELLIPTA) 62.5-25 mcg/actuation inhaler Take 1 Puff by inhalation daily.  Indications: BRONCHOSPASM PREVENTION WITH COPD  Qty: 1 Inhaler, Refills: 3    Associated Diagnoses: Chronic obstructive pulmonary disease, unspecified COPD type ([facility]      FOLIC ACID/VIT BCOMP,C (MIROSLAVA-IAIN PO) Take  by mouth. Associated Diagnoses: ESRD (end stage renal disease) on dialysis ([facility] Pulmonary hypertension ([facility] Chronic diastolic heart failure ([facility] Lupus ([facility] Pancreas cyst; Chronic pain syndrome; Exocrine pancreatic insufficiency; Anemia of chronic kidney failure, stage 5 ([facility] Current chronic use of systemic steroids      amylase-lipase-protease (CREON) 12,000-38,000 -60,000 unit capsule Take 1 Cap by mouth three (3) times daily (with meals). Qty: 90 Cap, Refills: 0      sevelamer carbonate (RENVELA) 800 mg tab tab Take 3 Tabs by mouth three (3) times daily (with meals). Qty: 90 Tab, Refills: 0      LORazepam (ATIVAN) 0.5 mg tablet Take 1 Tab by mouth two (2) times daily as needed for Anxiety. Max Daily Amount: 1 mg. Qty: 30 Tab, Refills: 0      aspirin delayed-release 81 mg tablet Take  by mouth daily. albuterol (PROVENTIL HFA, VENTOLIN HFA) 90 mcg/actuation inhaler Take 2 Puffs by inhalation every six (6) hours as needed. ondansetron hcl (ZOFRAN) 4 mg tablet Take 4 mg by mouth every eight (8) hours as needed. oxyCODONE IR (ROXICODONE) 15 mg immediate release tablet Take 15 mg by mouth two (2) times daily as needed.

## 2017-02-24 NOTE — ED PROVIDER NOTES
HPI Comments: 6:17 AM Gillian Petty is a 54 y.o. female with a history of stroke, CKD, Lupus, HTN, COPD, CHF, and ESRD presenting to the ED with L upper arm swelling after her fistula was infiltrated while being dialyzed two weeks ago. She is in the ED today because she states there was a \"bubble\" preset in the arm that has moved now to her chest. She is a Tuesday, Thursday, Saturday dialysis patient. She states she is not in pain but the arm is sore and throbbing. She has been seen by her nephrologist and was told she would be called for an X-ray follow up but was not called so she came to the ED for the X Ray. She also reports chronic nausea, intermittent fever, and chills. Pt denies SOB, numbness in her arms, and is not taking any blood thinners. No other complaints at this time. The history is provided by the patient.         Past Medical History:   Diagnosis Date    Cardiomegaly 10/1/2013    Chronic diastolic heart failure ([address] 10/1/2013    sob on activity     Chronic diastolic heart failure (HCC)     Chronic kidney disease     dialysis    Chronic obstructive pulmonary disease ([address] 10/1/2013    Chronic pain     Cirrhosis ([address]     Dialysis patient ([address] 12/17/2012    ESRD (end stage renal disease) ([address]     ESRD (end stage renal disease) ([address] 7/27/2016    HD since 9/11     Essential hypertension, benign 10/1/2013    severly elevated     Essential hypertension, benign 10/1/2013    severly elevated     Hypertension     Lupus ([address]     Mitral valve disorders 10/1/2013    Mitral valve disorders 10/1/2013    On home oxygen therapy     3 liters /min NC    Pancreatitis     Pancreatitis chronic     Precordial pain     Stroke [facility])     1998    Tricuspid valve disorders, specified as nonrheumatic 10/1/2013       Past Surgical History:   Procedure Laterality Date    HX CHOLECYSTECTOMY      HX GYN      BTL    HX GYN      4 c-sections    HX OTHER SURGICAL      renal dialysis shunt  HX OTHER SURGICAL      cysts in the axillary regions -          Family History:   Problem Relation Age of Onset    Hypertension Mother     Heart Disease Mother     Stroke Father     Heart Disease Father     Heart Disease Maternal Aunt     Diabetes Maternal Aunt        Social History     Social History    Marital status: LEGALLY      Spouse name: N/A    Number of children: N/A    Years of education: N/A     Occupational History    Not on file. Social History Main Topics    Smoking status: Former Smoker     Packs/day: 1.00     Years: 19.00     Types: Cigarettes     Quit date: 1/1/2003    Smokeless tobacco: Never Used    Alcohol use No    Drug use: No    Sexual activity: Not on file     Other Topics Concern    Not on file     Social History Narrative         ALLERGIES: Contrast agent [iodine]; Heparin analogues; and Prednisone    Review of Systems   Constitutional: Positive for chills and fever. Respiratory: Negative for shortness of breath. Cardiovascular: Negative for chest pain. Gastrointestinal: Positive for nausea (Chronic). Negative for vomiting. Musculoskeletal: Positive for back pain (Chronic). Skin: Negative for rash. All other systems reviewed and are negative. Vitals:    02/24/17 0621   BP: 140/90   Pulse: 79   Resp: 18   Temp: 98.1 °F (36.7 °C)   SpO2: 98%   Weight: 62.5 kg (137 lb 12.6 oz)   Height: 5' 8\" (1.727 m)            Physical Exam   Constitutional: She is oriented to person, place, and time. She appears well-developed. HENT:   Head: Normocephalic. Mouth/Throat: Oropharynx is clear and moist.   Eyes: Pupils are equal, round, and reactive to light. Neck: Normal range of motion. Cardiovascular: Normal rate and normal heart sounds. No murmur heard. AV fistula with positive thrill in L upper arm      Pulmonary/Chest: Effort normal. She has no wheezes. She has no rales. Abdominal: Soft. There is no tenderness.    Musculoskeletal: Normal range of motion. She exhibits edema and tenderness. 10x5 cm swelling, ecchymposis and mild tenderness to L medial upper arm    Neurological: She is alert and oriented to person, place, and time. Skin: Skin is warm and dry. Nursing note and vitals reviewed. MDM  Number of Diagnoses or Management Options  Hematoma of arm, left, initial encounter:      Amount and/or Complexity of Data Reviewed  Tests in the radiology section of CPT®: ordered and reviewed    Risk of Complications, Morbidity, and/or Mortality  Presenting problems: low  Diagnostic procedures: low  Management options: low    Patient Progress  Patient progress: stable    ED Course   Patient remained stable. Procedures      VITALS:  Patient Vitals for the past 24 hrs:   BP Temp Pulse Resp SpO2 Height Weight   17 0621 140/90 98.1 °F (36.7 °C) 79 18 98 % 5' 8\" (1.727 m) 62.5 kg (137 lb 12.6 oz)       ED MEDS:  Medications - No data to display     X-Ray, CT or other radiology findings or impressions:  CT HUMERUS LT WO CONT   Final Result            Progress notes, Consult notes or additional Procedure notes:   7:01 AM Erica Solitario MD signed out the patient to Dr. Salas Preble17   BP: 140/90   Pulse: 79   Resp: 18   Temp: 98.1 °F (36.7 °C)   SpO2: 98%   Weight: 62.5 kg (137 lb 12.6 oz)   Height: 5' 8\" (1.727 m)       Medications - No data to display    Patient's Medications   Start Taking    No medications on file   Continue Taking    ALBUTEROL (PROVENTIL HFA, VENTOLIN HFA) 90 MCG/ACTUATION INHALER    Take 2 Puffs by inhalation every six (6) hours as needed. AMLODIPINE (NORVASC) 10 MG TABLET    take 1 tablet by mouth once daily    AMYLASE-LIPASE-PROTEASE (CREON) 12,000-38,000 -60,000 UNIT CAPSULE    Take 1 Cap by mouth three (3) times daily (with meals). ASPIRIN DELAYED-RELEASE 81 MG TABLET    Take  by mouth daily.     BLOOD-GLUCOSE METER MONITORING KIT    Check blood fasting blood sugar and also after largest meal of the day    CARAFATE 100 MG/ML SUSPENSION    TAKE 10 ML BY MOUTH FOUR TIMES DAILY BEFORE MEALS AND AT BEDTIME    CARVEDILOL (COREG) 25 MG TABLET    Take 1 Tab by mouth two (2) times daily (with meals). ERGOCALCIFEROL (ERGOCALCIFEROL) 50,000 UNIT CAPSULE    Take 1 Cap by mouth every seven (7) days. FOLIC ACID/VIT BCOMP,C (MIROSLAVA-IAIN PO)    Take  by mouth. GLUCOSE BLOOD VI TEST STRIPS (BLOOD GLUCOSE TEST) STRIP    Check Fasting Blood Sugar and after largest meal of the day    LANCETS 32 GAUGE MISC    100 Each by Does Not Apply route two (2) times daily (with meals). LORAZEPAM (ATIVAN) 0.5 MG TABLET    Take 1 Tab by mouth two (2) times daily as needed for Anxiety. Max Daily Amount: 1 mg. ONDANSETRON HCL (ZOFRAN) 4 MG TABLET    Take 4 mg by mouth every eight (8) hours as needed. OXYCODONE IR (ROXICODONE) 15 MG IMMEDIATE RELEASE TABLET    Take 15 mg by mouth two (2) times daily as needed. PROMETHAZINE (PHENERGAN) 25 MG TABLET    Take 1 Tab by mouth every six (6) hours as needed for Nausea. SEVELAMER CARBONATE (RENVELA) 800 MG TAB TAB    Take 3 Tabs by mouth three (3) times daily (with meals). UMECLIDINIUM-VILANTEROL (ANORO ELLIPTA) 62.5-25 MCG/ACTUATION INHALER    Take 1 Puff by inhalation daily. Indications: BRONCHOSPASM PREVENTION WITH COPD   These Medications have changed    No medications on file   Stop Taking    CALCIUM ACETATE (PHOSLO) 667 MG CAP    Take  by mouth three (3) times daily (with meals). I have advised the patient to follow up with:      jessica Jayibing for, and in the presence of Dr. Opal Hancock MD 6:19 AM, 02/24/17. Signed by: Lilliana Cuenca, 02/24/17  Physician Attestation  I personally performed the services described in the documentation, reviewed the documentation, as recorded by the scribe in my presence, and it accurately and completely records my words and actions.     Opal Hancock MD    0800: re-evaluated patient:  Patient remained stable. Diagnosis: hematoma of arm      Disposition: discharged home, F/U with PCP or nephrologist in 24 hours without fail.

## 2017-02-24 NOTE — ED TRIAGE NOTES
Pt. States her dialysis shut infiltrated 2 weeks ago in dialysis and she has since been dialyzed and her nephrologist did lab work. She is wanting an xray done on the infiltrated arm.

## 2017-04-26 NOTE — MR AVS SNAPSHOT
Visit Information Date & Time Provider Department Dept. Phone Encounter #  
 4/26/2017 10:15 AM Kaiser Ashraf MD [facility] [phone] [id number] Follow-up Instructions Return in about 1 month (around 5/26/2017). Your Appointments 5/22/2017  8:45 AM  
Follow Up with Cathy Sandy DO [address] [place] [address] Avenue (--) Appt Note: follow up and Judy8 Aniket Goldberg [address]  
  
    
 7/26/2017  1:15 PM  
Follow Up with Kaiser Ashraf MD  
[facility] ([facility]) Appt Note: 6 month follow up  
 [address] [facility] [address]  
  
   
 [address] [place] [address] [id number] Upcoming Health Maintenance Date Due DTaP/Tdap/Td series (1 - Tdap) 7/1/1988 Pneumococcal 19-64 Highest Risk (2 of 3 - PCV13) 1/16/2016 PAP AKA CERVICAL CYTOLOGY 2/20/2020 Allergies as of 4/26/2017  Review Complete On: 4/26/2017 By: Kaiser Ashraf MD  
  
 Severity Noted Reaction Type Reactions Contrast Agent [Iodine] High 05/13/2015    Angioedema Heparin Analogues High 05/20/2015   Side Effect Other (comments) Thrombocytopenia, positive HIT panel (5/15/15) Prednisone  05/13/2015    Other (comments) Became comatosed Current Immunizations  Reviewed on 5/17/2015 No immunizations on file. Not reviewed this visit You Were Diagnosed With   
  
 Codes Comments ESRD (end stage renal disease) on dialysis ([facility]    -  Primary ICD-10-CM: N18.6, Z99.2 ICD-9-CM: 585.6, V45.11 pre op prior to renal transplant Chronic diastolic heart failure (HCC)     ICD-10-CM: I50.32 
ICD-9-CM: 428.32 Benign hypertension with ESRD (end-stage renal disease) ([facility]     ICD-10-CM: I12.0, N18.6 ICD-9-CM: 403.11, 585.6 Non-rheumatic mitral regurgitation     ICD-10-CM: I34.0 ICD-9-CM: 424.0 Pulmonary hypertension ([facility]     ICD-10-CM: I27.2 ICD-9-CM: 416.8 Anemia of chronic kidney failure, unspecified stage     ICD-10-CM: N18.9, D63.1 ICD-9-CM: 285.21 Heparin induced thrombocytopenia (HCC)     ICD-10-CM: W48.32 ICD-9-CM: 289.84 Vitals BP Pulse Height(growth percentile) Weight(growth percentile) BMI OB Status 118/73 75 5' 8\" (1.727 m) 149 lb (67.6 kg) 22.66 kg/m2 Medically Induced Smoking Status Former Smoker Vitals History BMI and BSA Data Body Mass Index Body Surface Area  
 22.66 kg/m 2 1.8 m 2 Preferred Pharmacy Pharmacy Name Phone [address] [facility], [id number] [place], [place] [address] Your Updated Medication List  
  
   
This list is accurate as of: 4/26/17 10:30 AM.  Always use your most recent med list.  
  
  
  
  
 albuterol 90 mcg/actuation inhaler Commonly known as:  PROVENTIL HFA, VENTOLIN HFA, PROAIR HFA Take 2 Puffs by inhalation every six (6) hours as needed. amLODIPine 10 mg tablet Commonly known as:  NORVASC  
take 1 tablet by mouth once daily  
  
 amylase-lipase-protease 12,000-38,000 -60,000 unit capsule Commonly known as:  CREON Take 1 Cap by mouth three (3) times daily (with meals). aspirin delayed-release 81 mg tablet Take  by mouth daily. Blood-Glucose Meter monitoring kit Check blood fasting blood sugar and also after largest meal of the day CARAFATE 100 mg/mL suspension Generic drug:  sucralfate TAKE 10 ML BY MOUTH FOUR TIMES DAILY BEFORE MEALS AND AT BEDTIME  
  
 carvedilol 25 mg tablet Commonly known as:  Ladona Graham Take 1 Tab by mouth two (2) times daily (with meals). diphenhydrAMINE 25 mg capsule Commonly known as:  BENADRYL Take 1 Cap by mouth two (2) times daily (with meals) for 2 days. ergocalciferol 50,000 unit capsule Commonly known as:  ERGOCALCIFEROL Take 1 Cap by mouth every seven (7) days. famotidine 20 mg tablet Commonly known as:  PEPCID Take 1 Tab by mouth two (2) times a day for 2 days. glucose blood VI test strips strip Commonly known as:  blood glucose test  
Check Fasting Blood Sugar and after largest meal of the day  
  
 lancets 32 gauge Misc  
100 Each by Does Not Apply route two (2) times daily (with meals). LORazepam 0.5 mg tablet Commonly known as:  ATIVAN Take 1 Tab by mouth two (2) times daily as needed for Anxiety. Max Daily Amount: 1 mg.  
  
 predniSONE 20 mg tablet Commonly known as:  Sylvia [place] Take 1 Tab by mouth two (2) times daily (with meals) for 2 days. promethazine 25 mg tablet Commonly known as:  PHENERGAN Take 1 Tab by mouth every six (6) hours as needed for Nausea. MIROSLAVA-IAIN PO Take  by mouth. ROXICODONE 15 mg immediate release tablet Generic drug:  oxyCODONE IR Take 15 mg by mouth two (2) times daily as needed. sevelamer carbonate 800 mg Tab tab Commonly known as:  Doty Anis Take 3 Tabs by mouth three (3) times daily (with meals). umeclidinium-vilanterol 62.5-25 mcg/actuation inhaler Commonly known as:  Caesar Salt Take 1 Puff by inhalation daily. Indications: BRONCHOSPASM PREVENTION WITH COPD  
  
 ZOFRAN (AS HYDROCHLORIDE) 4 mg tablet Generic drug:  ondansetron hcl Take 4 mg by mouth every eight (8) hours as needed. Prescriptions Sent to Pharmacy Refills  
 predniSONE (DELTASONE) 20 mg tablet 0 Sig: Take 1 Tab by mouth two (2) times daily (with meals) for 2 days. Class: Normal  
 Pharmacy: [address] [place] [address], [address] [place] [address] Ph #: [phone] Route: Oral  
 diphenhydrAMINE (BENADRYL) 25 mg capsule 0 Sig: Take 1 Cap by mouth two (2) times daily (with meals) for 2 days. Class: Normal  
 Pharmacy: [address] [place] [address], [address] [place] [address] Ph #: [phone]  Route: Oral  
 famotidine (PEPCID) 20 mg tablet 0  
 Sig: Take 1 Tab by mouth two (2) times a day for 2 days. Class: Normal  
 Pharmacy: [facility], [facility] #: [phone] Route: Oral  
  
Follow-up Instructions Return in about 1 month (around 5/26/2017). To-Do List   
 04/26/2017 Lab:  CBC W/O DIFF   
  
 04/26/2017 Lab:  MAGNESIUM   
  
 04/26/2017 Lab:  METABOLIC PANEL, COMPREHENSIVE   
  
 04/26/2017 Lab:  PROTHROMBIN TIME + INR   
  
 04/26/2017 Lab:  PTT   
  
 04/26/2017 Lab:  TSH 3RD GENERATION   
  
 05/05/2017 Cardiac Services:  CARDIAC CATHETERIZATION Patient Instructions High Blood Pressure: Care Instructions Your Care Instructions If your blood pressure is usually above 140/90, you have high blood pressure, or hypertension. That means the top number is 140 or higher or the bottom number is 90 or higher, or both. Despite what a lot of people think, high blood pressure usually doesn't cause headaches or make you feel dizzy or lightheaded. It usually has no symptoms. But it does increase your risk for heart attack, stroke, and kidney or eye damage. The higher your blood pressure, the more your risk increases. Your doctor will give you a goal for your blood pressure. Your goal will be based on your health and your age. An example of a goal is to keep your blood pressure below 140/90. Lifestyle changes, such as eating healthy and being active, are always important to help lower blood pressure. You might also take medicine to reach your blood pressure goal. 
Follow-up care is a key part of your treatment and safety. Be sure to make and go to all appointments, and call your doctor if you are having problems. It's also a good idea to know your test results and keep a list of the medicines you take. How can you care for yourself at home? Medical treatment · If you stop taking your medicine, your blood pressure will go back up. You may take one or more types of medicine to lower your blood pressure. Be safe with medicines. Take your medicine exactly as prescribed. Call your doctor if you think you are having a problem with your medicine. · Talk to your doctor before you start taking aspirin every day. Aspirin can help certain people lower their risk of a heart attack or stroke. But taking aspirin isn't right for everyone, because it can cause serious bleeding. · See your doctor regularly. You may need to see the doctor more often at first or until your blood pressure comes down. · If you are taking blood pressure medicine, talk to your doctor before you take decongestants or anti-inflammatory medicine, such as ibuprofen. Some of these medicines can raise blood pressure. · Learn how to check your blood pressure at home. Lifestyle changes · Stay at a healthy weight. This is especially important if you put on weight around the waist. Losing even 10 pounds can help you lower your blood pressure. · If your doctor recommends it, get more exercise. Walking is a good choice. Bit by bit, increase the amount you walk every day. Try for at least 30 minutes on most days of the week. You also may want to swim, bike, or do other activities. · Avoid or limit alcohol. Talk to your doctor about whether you can drink any alcohol. · Try to limit how much sodium you eat to less than 2,300 milligrams (mg) a day. Your doctor may ask you to try to eat less than 1,500 mg a day. · Eat plenty of fruits (such as bananas and oranges), vegetables, legumes, whole grains, and low-fat dairy products. · Lower the amount of saturated fat in your diet. Saturated fat is found in animal products such as milk, cheese, and meat. Limiting these foods may help you lose weight and also lower your risk for heart disease. · Do not smoke. Smoking increases your risk for heart attack and stroke.  If you need help quitting, talk to your doctor about stop-smoking programs and medicines. These can increase your chances of quitting for good. When should you call for help? Call 911 anytime you think you may need emergency care. This may mean having symptoms that suggest that your blood pressure is causing a serious heart or blood vessel problem. Your blood pressure may be over 180/110. For example, call 911 if: 
· You have symptoms of a heart attack. These may include: ¨ Chest pain or pressure, or a strange feeling in the chest. 
¨ Sweating. ¨ Shortness of breath. ¨ Nausea or vomiting. ¨ Pain, pressure, or a strange feeling in the back, neck, jaw, or upper belly or in one or both shoulders or arms. ¨ Lightheadedness or sudden weakness. ¨ A fast or irregular heartbeat. · You have symptoms of a stroke. These may include: 
¨ Sudden numbness, tingling, weakness, or loss of movement in your face, arm, or leg, especially on only one side of your body. ¨ Sudden vision changes. ¨ Sudden trouble speaking. ¨ Sudden confusion or trouble understanding simple statements. ¨ Sudden problems with walking or balance. ¨ A sudden, severe headache that is different from past headaches. · You have severe back or belly pain. Do not wait until your blood pressure comes down on its own. Get help right away. Call your doctor now or seek immediate care if: 
· Your blood pressure is much higher than normal (such as 180/110 or higher), but you don't have symptoms. · You think high blood pressure is causing symptoms, such as: ¨ Severe headache. ¨ Blurry vision. Watch closely for changes in your health, and be sure to contact your doctor if: 
· Your blood pressure measures 140/90 or higher at least 2 times. That means the top number is 140 or higher or the bottom number is 90 or higher, or both. · You think you may be having side effects from your blood pressure medicine. · Your blood pressure is usually normal, but it goes above normal at least 2 times. Where can you learn more? Go to http://romeo-mónica.info/. Enter Y141 in the search box to learn more about \"High Blood Pressure: Care Instructions. \" Current as of: August 8, 2016 Content Version: 11.2 © 7598-9485 [employer]. Care instructions adapted under license by [employer] (which disclaims liability or warranty for this information). If you have questions about a medical condition or this instruction, always ask your healthcare professional. [address] any warranty or liability for your use of this information. Instructions Patients Name:  Marcelo Shaw 1. You are scheduled to have a Cath on 5/5/17  at [facility] Please check in at        . 2. Please go to [facility] and park in the outpatient parking lot that is located around to the back of the hospital and enter through the [facility] building. Once you enter through the [facility] check in with the  there. The  will either give you directions or assist you in getting to the cath holding area. 3. You are not to eat anything after midnight before the procedure. Please continue to drink fluids up until 12:00.  (water, juice, black coffee, soft drinks). Do not drink milk or milk products or anything with cream. You may take medications(except for diabetes) with a small sip of water before 6am on the day of the procedure. Lorenso Frankel 4. If you are diabetic, do not take your insulin/sugar pill the morning of the procedure. 5. MEDICATION INSTRUCTIONS:   Please take your morning medications with the following special instructions: 
 
          Please make sure to take your Blood pressure medication : With just enough enough water to swallow. Take your Aspirin and/or Plavix. With just enough water to swallow. Stop your Coumadin on and do not resume it until after the procedure. Take Prednisone 60 mg and Benadryl 25 mg by mouth at Bedtime on  and again on  at . This is to prevent you from having an allergic reaction to the dye. 6. We encourage families to wait in the waiting room on the first floor while the procedure is being done. The Doctor will come out and talk with you as soon as the procedure is over. 7. There is the possibility that you may spend the night in the hospital, depending on the results of the procedure. This will be determined after the procedure is done. If angioplasty or stent is planned, you will stay at least one day. 8. If you or your family have any questions, please call our office Monday Friday, 9:00 a. m.4:30 p.m.,  At [phone], and ask to speak to one of the nurses. Please provide this summary of care documentation to your next provider. Your primary care clinician is listed as Viola Lagunas. If you have any questions after today's visit, please call [phone].

## 2017-04-26 NOTE — PROGRESS NOTES
HISTORY OF PRESENT ILLNESS  Mauricio Calixto is a 54 y.o. female. CHF   The history is provided by the patient. This is a chronic problem. The current episode started more than 1 week ago. The problem occurs every several days. The problem has not changed since onset. Pertinent negatives include no chest pain, no abdominal pain, no headaches and no shortness of breath. Valvular Heart Disease   The history is provided by the patient. This is a chronic problem. The current episode started more than 1 week ago. The problem occurs every several days. The problem has not changed since onset. Pertinent negatives include no chest pain, no abdominal pain, no headaches and no shortness of breath. Hypertension   The history is provided by the patient. This is a chronic problem. The current episode started more than 1 week ago. The problem occurs daily. The problem has been gradually improving. Pertinent negatives include no chest pain, no abdominal pain, no headaches and no shortness of breath. Review of Systems   Constitutional: Negative for chills, diaphoresis, fever and weight loss. HENT: Negative for ear pain and hearing loss. Eyes: Negative for blurred vision. Respiratory: Negative for cough, hemoptysis, sputum production, shortness of breath, wheezing and stridor. Cardiovascular: Negative for chest pain, palpitations, orthopnea, claudication, leg swelling and PND. Gastrointestinal: Negative for abdominal pain, heartburn, nausea and vomiting. Musculoskeletal: Negative for myalgias and neck pain. Skin: Negative for rash. Neurological: Negative for dizziness, tingling, tremors, focal weakness, loss of consciousness, weakness and headaches. Psychiatric/Behavioral: Negative for depression and suicidal ideas.      Family History   Problem Relation Age of Onset    Hypertension Mother     Heart Disease Mother     Stroke Father     Heart Disease Father     Heart Disease Maternal Aunt     Diabetes Maternal Aunt        Past Medical History:   Diagnosis Date    Cardiomegaly 10/1/2013    Chronic diastolic heart failure ([address] 10/1/2013    sob on activity     Chronic diastolic heart failure (HCC)     Chronic kidney disease     dialysis    Chronic obstructive pulmonary disease ([address] 10/1/2013    Chronic pain     Cirrhosis ([address]     Dialysis patient ([address] 12/17/2012    ESRD (end stage renal disease) ([address]     ESRD (end stage renal disease) ([address] 7/27/2016    HD since 9/11     Essential hypertension, benign 10/1/2013    severly elevated     Essential hypertension, benign 10/1/2013    severly elevated     Hypertension     Lupus ([address]     Mitral valve disorders 10/1/2013    Mitral valve disorders 10/1/2013    On home oxygen therapy     3 liters /min NC    Pancreatitis     Pancreatitis chronic     Precordial pain     Stroke ([address]     1998    Tricuspid valve disorders, specified as nonrheumatic 10/1/2013       Past Surgical History:   Procedure Laterality Date    HX CHOLECYSTECTOMY      HX GYN      BTL    HX GYN      4 c-sections    HX OTHER SURGICAL      renal dialysis shunt    HX OTHER SURGICAL      cysts in the axillary regions -        Social History   Substance Use Topics    Smoking status: Former Smoker     Packs/day: 1.00     Years: 19.00     Types: Cigarettes     Quit date: 1/1/2003    Smokeless tobacco: Never Used    Alcohol use No       Allergies   Allergen Reactions    Contrast Agent [Iodine] Angioedema    Heparin Analogues Other (comments)     Thrombocytopenia, positive HIT panel (5/15/15)    Prednisone Other (comments)     Became comatosed       Outpatient Prescriptions Marked as Taking for the 4/26/17 encounter (Office Visit) with Isaiah Lozano MD   Medication Sig Dispense Refill    predniSONE (DELTASONE) 20 mg tablet Take 1 Tab by mouth two (2) times daily (with meals) for 2 days.  4 Tab 0    diphenhydrAMINE (BENADRYL) 25 mg capsule Take 1 Cap by mouth two (2) times daily (with meals) for 2 days. 4 Cap 0    famotidine (PEPCID) 20 mg tablet Take 1 Tab by mouth two (2) times a day for 2 days. 4 Tab 0    ergocalciferol (ERGOCALCIFEROL) 50,000 unit capsule Take 1 Cap by mouth every seven (7) days. 12 Cap 3    amLODIPine (NORVASC) 10 mg tablet take 1 tablet by mouth once daily 30 Tab 6    CARAFATE 100 mg/mL suspension TAKE 10 ML BY MOUTH FOUR TIMES DAILY BEFORE MEALS AND AT BEDTIME 240 mL 0    promethazine (PHENERGAN) 25 mg tablet Take 1 Tab by mouth every six (6) hours as needed for Nausea. 12 Tab 3    Blood-Glucose Meter monitoring kit Check blood fasting blood sugar and also after largest meal of the day 1 Kit 0    glucose blood VI test strips (BLOOD GLUCOSE TEST) strip Check Fasting Blood Sugar and after largest meal of the day 100 Strip 3    lancets 32 gauge misc 100 Each by Does Not Apply route two (2) times daily (with meals). 100 Lancet 1    carvedilol (COREG) 25 mg tablet Take 1 Tab by mouth two (2) times daily (with meals). 60 Tab 6    umeclidinium-vilanterol (ANORO ELLIPTA) 62.5-25 mcg/actuation inhaler Take 1 Puff by inhalation daily. Indications: BRONCHOSPASM PREVENTION WITH COPD 1 Inhaler 3    FOLIC ACID/VIT BCOMP,C (MIROSLAVA-IAIN PO) Take  by mouth.  amylase-lipase-protease (CREON) 12,000-38,000 -60,000 unit capsule Take 1 Cap by mouth three (3) times daily (with meals). (Patient taking differently: Take  by mouth daily.) 90 Cap 0    sevelamer carbonate (RENVELA) 800 mg tab tab Take 3 Tabs by mouth three (3) times daily (with meals). 90 Tab 0    LORazepam (ATIVAN) 0.5 mg tablet Take 1 Tab by mouth two (2) times daily as needed for Anxiety. Max Daily Amount: 1 mg. 30 Tab 0    aspirin delayed-release 81 mg tablet Take  by mouth daily.  albuterol (PROVENTIL HFA, VENTOLIN HFA) 90 mcg/actuation inhaler Take 2 Puffs by inhalation every six (6) hours as needed.       ondansetron hcl (ZOFRAN) 4 mg tablet Take 4 mg by mouth every eight (8) hours as needed.  oxyCODONE IR (ROXICODONE) 15 mg immediate release tablet Take 15 mg by mouth two (2) times daily as needed. Visit Vitals    /73    Pulse 75    Ht 5' 8\" (1.727 m)    Wt 67.6 kg (149 lb)    BMI 22.66 kg/m2     Physical Exam   Constitutional: She is oriented to person, place, and time. She appears well-developed and well-nourished. No distress. HENT:   Head: Atraumatic. Mouth/Throat: No oropharyngeal exudate. Eyes: Conjunctivae are normal. No scleral icterus. Neck: Neck supple. No JVD present. Cardiovascular: Normal rate and regular rhythm. Exam reveals no gallop. Murmur: 2/6 ejection systolic murmur best heard at aortic area and apexl. Pulmonary/Chest: Effort normal and breath sounds normal. No stridor. She has no wheezes. She has no rales. Abdominal: Soft. There is no tenderness. There is no rebound. Musculoskeletal: Normal range of motion. She exhibits no edema or tenderness. Neurological: She is alert and oriented to person, place, and time. She exhibits normal muscle tone. Skin: Skin is warm. She is not diaphoretic. Psychiatric: She has a normal mood and affect. Her behavior is normal.       ASSESSMENT and PLAN    ICD-10-CM ICD-9-CM    1. ESRD (end stage renal disease) on dialysis ([facility]) N18.6 585.6 MAGNESIUM    J55.2 A66.85 METABOLIC PANEL, COMPREHENSIVE      PTT      PROTHROMBIN TIME + INR      TSH 3RD GENERATION      CBC W/O DIFF      CARDIAC CATHETERIZATION    pre op prior to renal transplant   2. Chronic diastolic heart failure ([facility]) I50.32 428.32    3. Benign hypertension with ESRD (end-stage renal disease) ([facility]) I12.0 403.11     N18.6 585.6    4. Non-rheumatic mitral regurgitation I34.0 424.0    5. Pulmonary hypertension ([facility]) I27.2 416.8    6. Anemia of chronic kidney failure, unspecified stage N18.9 285.21     D63.1     7.  Heparin induced thrombocytopenia ([facility]) D75.82 289.84      Orders Placed This Encounter    MAGNESIUM     Standing Status: Future     Standing Expiration Date:   1/42/4680    METABOLIC PANEL, COMPREHENSIVE     Standing Status:   Future     Standing Expiration Date:   4/27/2018    PTT     Standing Status:   Future     Standing Expiration Date:   4/27/2018    PROTHROMBIN TIME + INR     Standing Status:   Future     Standing Expiration Date:   4/27/2018    TSH 3RD GENERATION     Standing Status:   Future     Standing Expiration Date:   4/27/2018    CBC W/O DIFF     Standing Status:   Future     Standing Expiration Date:   4/27/2018    CARDIAC CATHETERIZATION     Standing Status:   Future     Standing Expiration Date:   10/26/2017     Order Specific Question:   Reason for Exam:     Answer:   pre op prior to renal transplant    predniSONE (DELTASONE) 20 mg tablet     Sig: Take 1 Tab by mouth two (2) times daily (with meals) for 2 days. Dispense:  4 Tab     Refill:  0    diphenhydrAMINE (BENADRYL) 25 mg capsule     Sig: Take 1 Cap by mouth two (2) times daily (with meals) for 2 days. Dispense:  4 Cap     Refill:  0    famotidine (PEPCID) 20 mg tablet     Sig: Take 1 Tab by mouth two (2) times a day for 2 days. Dispense:  4 Tab     Refill:  0     Follow-up Disposition:  Return in about 1 month (around 5/26/2017). current treatment plan is effective, no change in therapy  reviewed diet, exercise and weight control  use of aspirin to prevent MI and TIA's discussed. Patient's BP is significantly better. Has MR murmur is also better than prior examinations. Continue current meds  Was seen for renal transplant-- recommended to get RHC/LHC prior to listing. Patient has very questionable allergy to prednisone-- she was told that her AMS was due to high dose steriod given for prolonged period of time and that she was okay to take low doses. She has true allergy to contrast-- will need premedication prior to procedure. Above plan discussed with patient. All questions answered.

## 2017-04-26 NOTE — PATIENT INSTRUCTIONS
High Blood Pressure: Care Instructions  Your Care Instructions  If your blood pressure is usually above 140/90, you have high blood pressure, or hypertension. That means the top number is 140 or higher or the bottom number is 90 or higher, or both. Despite what a lot of people think, high blood pressure usually doesn't cause headaches or make you feel dizzy or lightheaded. It usually has no symptoms. But it does increase your risk for heart attack, stroke, and kidney or eye damage. The higher your blood pressure, the more your risk increases. Your doctor will give you a goal for your blood pressure. Your goal will be based on your health and your age. An example of a goal is to keep your blood pressure below 140/90. Lifestyle changes, such as eating healthy and being active, are always important to help lower blood pressure. You might also take medicine to reach your blood pressure goal.  Follow-up care is a key part of your treatment and safety. Be sure to make and go to all appointments, and call your doctor if you are having problems. It's also a good idea to know your test results and keep a list of the medicines you take. How can you care for yourself at home? Medical treatment  · If you stop taking your medicine, your blood pressure will go back up. You may take one or more types of medicine to lower your blood pressure. Be safe with medicines. Take your medicine exactly as prescribed. Call your doctor if you think you are having a problem with your medicine. · Talk to your doctor before you start taking aspirin every day. Aspirin can help certain people lower their risk of a heart attack or stroke. But taking aspirin isn't right for everyone, because it can cause serious bleeding. · See your doctor regularly. You may need to see the doctor more often at first or until your blood pressure comes down.   · If you are taking blood pressure medicine, talk to your doctor before you take decongestants or anti-inflammatory medicine, such as ibuprofen. Some of these medicines can raise blood pressure. · Learn how to check your blood pressure at home. Lifestyle changes  · Stay at a healthy weight. This is especially important if you put on weight around the waist. Losing even 10 pounds can help you lower your blood pressure. · If your doctor recommends it, get more exercise. Walking is a good choice. Bit by bit, increase the amount you walk every day. Try for at least 30 minutes on most days of the week. You also may want to swim, bike, or do other activities. · Avoid or limit alcohol. Talk to your doctor about whether you can drink any alcohol. · Try to limit how much sodium you eat to less than 2,300 milligrams (mg) a day. Your doctor may ask you to try to eat less than 1,500 mg a day. · Eat plenty of fruits (such as bananas and oranges), vegetables, legumes, whole grains, and low-fat dairy products. · Lower the amount of saturated fat in your diet. Saturated fat is found in animal products such as milk, cheese, and meat. Limiting these foods may help you lose weight and also lower your risk for heart disease. · Do not smoke. Smoking increases your risk for heart attack and stroke. If you need help quitting, talk to your doctor about stop-smoking programs and medicines. These can increase your chances of quitting for good. When should you call for help? Call 911 anytime you think you may need emergency care. This may mean having symptoms that suggest that your blood pressure is causing a serious heart or blood vessel problem. Your blood pressure may be over 180/110. For example, call 911 if:  · You have symptoms of a heart attack. These may include:  ¨ Chest pain or pressure, or a strange feeling in the chest.  ¨ Sweating. ¨ Shortness of breath. ¨ Nausea or vomiting. ¨ Pain, pressure, or a strange feeling in the back, neck, jaw, or upper belly or in one or both shoulders or arms.   ¨ Lightheadedness or sudden weakness. ¨ A fast or irregular heartbeat. · You have symptoms of a stroke. These may include:  ¨ Sudden numbness, tingling, weakness, or loss of movement in your face, arm, or leg, especially on only one side of your body. ¨ Sudden vision changes. ¨ Sudden trouble speaking. ¨ Sudden confusion or trouble understanding simple statements. ¨ Sudden problems with walking or balance. ¨ A sudden, severe headache that is different from past headaches. · You have severe back or belly pain. Do not wait until your blood pressure comes down on its own. Get help right away. Call your doctor now or seek immediate care if:  · Your blood pressure is much higher than normal (such as 180/110 or higher), but you don't have symptoms. · You think high blood pressure is causing symptoms, such as:  ¨ Severe headache. ¨ Blurry vision. Watch closely for changes in your health, and be sure to contact your doctor if:  · Your blood pressure measures 140/90 or higher at least 2 times. That means the top number is 140 or higher or the bottom number is 90 or higher, or both. · You think you may be having side effects from your blood pressure medicine. · Your blood pressure is usually normal, but it goes above normal at least 2 times. Where can you learn more? Go to http://romeo-mónica.info/. Enter W631 in the search box to learn more about \"High Blood Pressure: Care Instructions. \"  Current as of: August 8, 2016  Content Version: 11.2  © 2958-2502 [employer]. Care instructions adapted under license by [employer] (which disclaims liability or warranty for this information). If you have questions about a medical condition or this instruction, always ask your healthcare professional. [place] [address] any warranty or liability for your use of this information. Instructions    Patients Name:  George Brown    1.  [facility] Please check in at        . 2. Please go to [facility] and park in the outpatient parking lot that is located around to the back of the hospital and enter through the [facility] building. Once you enter through the [facility] check in with the  there. The  will either give you directions or assist you in getting to the cath holding area. 3. You are not to eat anything after midnight before the procedure. Please continue to drink fluids up until 12:00.  (water, juice, black coffee, soft drinks). Do not drink milk or milk products or anything with cream. You may take medications(except for diabetes) with a small sip of water before 6am on the day of the procedure. .    4. If you are diabetic, do not take your insulin/sugar pill the morning of the procedure. 5. MEDICATION INSTRUCTIONS:   Please take your morning medications with the following special instructions:    [x]          Please make sure to take your Blood pressure medication : With just enough enough water to swallow. [x]          Take your Aspirin and/or Plavix. With just enough water to swallow. []          Stop your Coumadin on and do not resume it until after the procedure.     []          Take Prednisone 60 mg and Benadryl 25 mg by mouth at Bedtime on  and again on  at . This is to prevent you from having an allergic reaction to the dye. 6. We encourage families to wait in the waiting room on the first floor while the procedure is being done. The Doctor will come out and talk with you as soon as the procedure is over. 7. There is the possibility that you may spend the night in the hospital, depending on the results of the procedure. This will be determined after the procedure is done. If angioplasty or stent is planned, you will stay at least one day. 8. If you or your family have any questions, please call our office Monday Friday, 9:00 a. m.4:30 pjuliana,  At [phone], and ask to speak to one of the nurses.

## 2017-04-26 NOTE — PROGRESS NOTES
1. Have you been to the ER, urgent care clinic since your last visit? Hospitalized since your last visit? No    2. Have you seen or consulted any other health care providers outside of the [address] [place] [address] since your last visit? Include any pap smears or colon screening. Yes Where: GI     3. Since your last visit, have you had any of the following symptoms? .           4. Have you had any blood work, X-rays or cardiac testing? Yes Where: [facility]              5.  Where do you normally have your labs drawn? [facility]    6. Do you need any refills today?    No

## 2017-05-05 NOTE — TELEPHONE ENCOUNTER
Returned call to patient and I have advised her that she should contact the cardiology specialist so that she can address her concerns and question.

## 2017-05-08 NOTE — TELEPHONE ENCOUNTER
----- Message from Merly Banks MD sent at 5/5/2017  2:47 PM EDT -----  Regarding: RE: Cath cancelled  Cant admit patient for premed. i have only given low dose prednisone for allergy. She cant have the procedure without prophylaxis  She can think about it and call us back. ----- Message -----     From: Kendell Rosario RN     Sent: 5/5/2017   1:25 PM       To: Merly Banks MD  Subject: Cath cancelled                                   Dr. Meir Mosley, Ms. Antoni Love called today to explain why she cancelled her cath this morning. She stated that she was scared to take the prednisone and knew that she couldn't go through with the cath without it. She stated that the last time she took prednisone that she had a blood glucose of 1200, and was in a coma, even though she is not diabetic then or now. She stated that she is \"literally shaking\" about having to take this medication and would rather be admitted to the hospital for 2 days with the nurses taking her blood sugar every 2 hours, even though she is not diabetic. Please advise.

## 2017-05-09 NOTE — TELEPHONE ENCOUNTER
----- Message from Linda Allen MD sent at 5/5/2017  2:47 PM EDT -----  Regarding: RE: Cath cancelled  Cant admit patient for premed. i have only given low dose prednisone for allergy. She cant have the procedure without prophylaxis  She can think about it and call us back. ----- Message -----     From: Joanne Riley RN     Sent: 5/5/2017   1:25 PM       To: Linda Allen MD  Subject: Cath cancelled                                   Dr. Maryann Palma, Ms. Eileen Holland called today to explain why she cancelled her cath this morning. She stated that she was scared to take the prednisone and knew that she couldn't go through with the cath without it. She stated that the last time she took prednisone that she had a blood glucose of 1200, and was in a coma, even though she is not diabetic then or now. She stated that she is \"literally shaking\" about having to take this medication and would rather be admitted to the hospital for 2 days with the nurses taking her blood sugar every 2 hours, even though she is not diabetic. Please advise.

## 2017-05-09 NOTE — TELEPHONE ENCOUNTER
Patient calling to discuss pre op meds for contrast allergy. Discussed with Dr. Emory Lugo, angiogram ordered by nephrologist for placement on transplant list. She will call nephrologist for concerns and call back.

## 2017-05-09 NOTE — TELEPHONE ENCOUNTER
Patient called to discuss prep for contrast allergy. She is still worried regarding prednisone, she now states that her blood glucose is elevated 200'S this week, does not have diabetes but does have pancreatitis. She has issues with glucose being elevated to 1200 while taking prednisone. She will call her PCp and discuss elevated glucose and then call back to reschedule cath at her convienience.

## 2017-05-15 NOTE — TELEPHONE ENCOUNTER
Dr. Jose Luis Hopkins,    Patient called stating that she is ready to reschedule her Cath that was requested by the transplant center. Please Advise with which day you would like to reschedule.

## 2017-05-17 NOTE — TELEPHONE ENCOUNTER
Patient returned call and was aware of need for medication. She voices understanding and acceptance of this advice and will call back if any further questions or concerns.

## 2017-05-18 NOTE — TELEPHONE ENCOUNTER
Dr. Juan Little,    Patient is scheduled for a cath on [facility] Cath/January [facility]/ time 8/Pt time 7. Patient needs a Tuesday or Thursday because of dialysis. Please Advise. Does patient need to take any benadryl or anything for contrast allergy also?

## 2017-05-19 NOTE — TELEPHONE ENCOUNTER
Left message with Shi Ny to change Cath to June 8th. Patient is aware that she is scheduled for cath on 6/8/17 Dr Time 8, Pt time 7.

## 2017-06-06 NOTE — TELEPHONE ENCOUNTER
Called patient and gave  Her instructions for procedure on 6/8/17, instructed patient to take medication 48 hours prior to procedure and take prednisone 20 mg for 2 days and go to ER if blood sugar is >350. She voices understanding and acceptance of this advice and will call back if any further questions or concerns.

## 2017-06-06 NOTE — TELEPHONE ENCOUNTER
Was asked to take prednisone 20mg bid for 2 days.   Report to ER if Bld sugar >350  She needs premed 48hrs prior to procedure

## 2017-06-06 NOTE — TELEPHONE ENCOUNTER
Patient was in office today with concerns with medications and blood sugar reading. Patient is schedule to have cath on June 8th and you order Benadryl, famotidine, and prednisone and wants to know when should she start taking the medications and how high should her blood sugar be before she go to hospital, d/t last procedure her blood sugar was over 1200 and she had stroke and hospitalized was almost 2 weeks.  Please advise

## 2017-06-08 NOTE — H&P
H&P update     Risks, benefits and alternatives of L & RHC exaplained to patient.   All questions answered

## 2017-06-08 NOTE — IP AVS SNAPSHOT
[address] Patient: Eddie Calhoun MRN: [id number] :1965 You are allergic to the following Allergen Reactions Contrast Agent (Iodine) Angioedema Heparin Analogues Other (comments) Thrombocytopenia, positive HIT panel (5/15/15) Prednisone Other (comments) Became comatosed Recent Documentation Height Weight Breastfeeding? BMI OB Status Smoking Status 1.727 m 66.7 kg No 22.36 kg/m2 Medically Induced Former Smoker Emergency Contacts Name Discharge Info Relation Home Work Mobile Franco,Shon DISCHARGE CAREGIVER [3] Daughter [21]   [phone] About your hospitalization You were admitted on:  2017 You last received care in the:  [facility] You were discharged on:  2017 Unit phone number:  [phone] Why you were hospitalized Your primary diagnosis was:  Not on File Providers Seen During Your Hospitalizations Provider Role Specialty Primary office phone Jose Luis Gasca MD Attending Provider Cardiology [phone] Your Primary Care Physician (PCP) Primary Care Physician Office Phone Office Fax Vaughn [place] [phone] Follow-up Information Follow up With Details Comments Contact Info Jose Luis Gasca MD Schedule an appointment as soon as possible for a visit in 2 weeks follow up [address] [place] [address] [facility] [address] [phone] Your Appointments 2017  2:15 PM EDT Follow Up with [address] [facility] (--)  
 [address] [address]  
[phone] Current Discharge Medication List  
  
ASK your doctor about these medications Dose & Instructions Dispensing Information Comments Morning Noon Evening Bedtime  
 albuterol 90 mcg/actuation inhaler Commonly known as:  PROVENTIL HFA, VENTOLIN HFA, PROAIR HFA Your last dose was: Your next dose is:    
   
   
 Dose:  2 Puff Take 2 Puffs by inhalation every six (6) hours as needed. Refills:  0  
     
   
   
   
  
 amLODIPine 10 mg tablet Commonly known as:  Annie Lewis Your last dose was: Your next dose is:    
   
   
 take 1 tablet by mouth once daily Quantity:  30 Tab Refills:  6  
     
   
   
   
  
 amylase-lipase-protease 12,000-38,000 -60,000 unit capsule Commonly known as:  CREON Your last dose was: Your next dose is:    
   
   
 Dose:  1 Cap Take 1 Cap by mouth three (3) times daily (with meals). Quantity:  90 Cap Refills:  0  
     
   
   
   
  
 aspirin delayed-release 81 mg tablet Your last dose was: Your next dose is: Take  by mouth daily. Refills:  0 Blood-Glucose Meter monitoring kit Your last dose was: Your next dose is:    
   
   
 Check blood fasting blood sugar and also after largest meal of the day Quantity:  1 Kit Refills:  0  
     
   
   
   
  
 CARAFATE 100 mg/mL suspension Generic drug:  sucralfate Your last dose was: Your next dose is: TAKE 10 ML BY MOUTH FOUR TIMES DAILY BEFORE MEALS AND AT BEDTIME Quantity:  240 mL Refills:  0  
     
   
   
   
  
 carvedilol 25 mg tablet Commonly known as:  Karen Scott Your last dose was: Your next dose is: TAKE 1 BY MOUTH TWO TIMES  DAILY WITH MEALS Quantity:  60 Tab Refills:  1  
     
   
   
   
  
 ergocalciferol 50,000 unit capsule Commonly known as:  ERGOCALCIFEROL Your last dose was: Your next dose is:    
   
   
 Dose:  [address] Units Take 1 Cap by mouth every seven (7) days. Quantity:  12 Cap Refills:  3 glucose blood VI test strips strip Commonly known as:  blood glucose test  
   
Your last dose was: Your next dose is:    
   
   
 Check Fasting Blood Sugar and after largest meal of the day Quantity:  100 Strip Refills:  3  
     
   
   
   
  
 lancets 32 gauge Misc Your last dose was: Your next dose is:    
   
   
 Dose:  100 Each  
100 Each by Does Not Apply route two (2) times daily (with meals). Quantity:  100 Lancet Refills:  1 LORazepam 0.5 mg tablet Commonly known as:  ATIVAN Your last dose was: Your next dose is:    
   
   
 Dose:  0.5 mg Take 1 Tab by mouth two (2) times daily as needed for Anxiety. Max Daily Amount: 1 mg. Quantity:  30 Tab Refills:  0  
     
   
   
   
  
 promethazine 25 mg tablet Commonly known as:  PHENERGAN Your last dose was: Your next dose is:    
   
   
 Dose:  25 mg Take 1 Tab by mouth every six (6) hours as needed for Nausea. Quantity:  12 Tab Refills:  3 MIROSLAVA-IAIN PO Your last dose was: Your next dose is: Take  by mouth. Refills:  0  
     
   
   
   
  
 ROXICODONE 15 mg immediate release tablet Generic drug:  oxyCODONE IR Your last dose was: Your next dose is:    
   
   
 Dose:  15 mg Take 15 mg by mouth two (2) times daily as needed. Refills:  0  
     
   
   
   
  
 sevelamer carbonate 800 mg Tab tab Commonly known as:  Pamela Pellant Your last dose was: Your next dose is:    
   
   
 Dose:  2400 mg Take 3 Tabs by mouth three (3) times daily (with meals). Quantity:  90 Tab Refills:  0  
     
   
   
   
  
 umeclidinium-vilanterol 62.5-25 mcg/actuation inhaler Commonly known as:  Caleen Lava Your last dose was: Your next dose is:    
   
   
 Dose:  1 Puff Take 1 Puff by inhalation daily.  Indications: BRONCHOSPASM PREVENTION WITH COPD  
 Quantity:  1 Inhaler Refills:  3 ZOFRAN (AS HYDROCHLORIDE) 4 mg tablet Generic drug:  ondansetron hcl Your last dose was: Your next dose is:    
   
   
 Dose:  4 mg Take 4 mg by mouth every eight (8) hours as needed. Refills:  0 Discharge Instructions Left Heart Catheterization: About This Test 
What is it? Cardiac catheterization is a test to check the left side of your heart. Your doctor might look at the shape of your heart, the motion of your heart, or the blood pressure inside the chambers. Why is this test done? This test gives information about how your heart is working. It can: · Check blood flow and blood pressure in the chambers of the heart. · Check the pumping action of the heart. · Find out if a heart defect is present and how severe it is. · Find out how well the heart valves work. What happens during the test? 
· You will get medicine to help you relax. · A thin tube called a catheter is put into a blood vessel in the groin or the arm. The doctor moves the catheter through the blood vessel into your heart. · You will get a shot to numb the skin where the catheter goes in. You may feel pressure when the doctor moves the catheter through your blood vessel into your heart. · Dye may be injected into your heart. Your doctor can watch on special monitors as the dye moves in your heart. The dye helps your doctor see blood flow in your heart. · You may feel hot or flushed for several seconds when the dye is put in. 
· If a heart defect is found, cardiac catheterization sometimes is used to correct it during the test. 
How long does it take? · The test will take about 30 minutes. If a problem is found and the doctor treats it, it can take a few hours longer. What happens after the test? 
· You may stay in bed for several hours. · You may or may not need to stay in the hospital overnight. You will get more instructions for what to do at home. · Drink plenty of fluids for several hours after the test. 
Follow-up care is a key part of your treatment and safety. Be sure to make and go to all appointments, and call your doctor if you are having problems. It's also a good idea to know your test results and keep a list of the medicines you take. Where can you learn more? Go to http://romeo-mónica.info/. Enter W306 in the search box to learn more about \"Left Heart Catheterization: About This Test.\" Current as of: January 27, 2016 Content Version: 11.2 © 0550-2351 [employer]. Care instructions adapted under license by [employer] (which disclaims liability or warranty for this information). If you have questions about a medical condition or this instruction, always ask your healthcare professional. [place] [address] any warranty or liability for your use of this information. DISCHARGE SUMMARY from Nurse The following personal items are in your possession at time of discharge: 
 
  
Visual Aid: None Clothing: With patient PATIENT INSTRUCTIONS: 
 
After general anesthesia or intravenous sedation, for 24 hours or while taking prescription Narcotics: · Limit your activities · Do not drive and operate hazardous machinery · Do not make important personal or business decisions · Do  not drink alcoholic beverages · If you have not urinated within 8 hours after discharge, please contact your surgeon on call. Report the following to your surgeon: 
· Excessive pain, swelling, redness or odor of or around the surgical area · Temperature over 100.5 · Nausea and vomiting lasting longer than 4 hours or if unable to take medications · Any signs of decreased circulation or nerve impairment to extremity: change in color, persistent  numbness, tingling, coldness or increase pain · Any questions What to do at Home: *  Please give a list of your current medications to your Primary Care Provider. *  Please update this list whenever your medications are discontinued, doses are 
    changed, or new medications (including over-the-counter products) are added. *  Please carry medication information at all times in case of emergency situations. These are general instructions for a healthy lifestyle: No smoking/ No tobacco products/ Avoid exposure to second hand smoke Surgeon General's Warning:  Quitting smoking now greatly reduces serious risk to your health. Obesity, smoking, and sedentary lifestyle greatly increases your risk for illness A healthy diet, regular physical exercise & weight monitoring are important for maintaining a healthy lifestyle You may be retaining fluid if you have a history of heart failure or if you experience any of the following symptoms:  Weight gain of 3 pounds or more overnight or 5 pounds in a week, increased swelling in our hands or feet or shortness of breath while lying flat in bed. Please call your doctor as soon as you notice any of these symptoms; do not wait until your next office visit. Recognize signs and symptoms of STROKE: 
 
F-face looks uneven A-arms unable to move or move unevenly S-speech slurred or non-existent T-time-call 911 as soon as signs and symptoms begin-DO NOT go Back to bed or wait to see if you get better-TIME IS BRAIN. Warning Signs of HEART ATTACK Call 911 if you have these symptoms: 
? Chest discomfort. Most heart attacks involve discomfort in the center of the chest that lasts more than a few minutes, or that goes away and comes back. It can feel like uncomfortable pressure, squeezing, fullness, or pain. ? Discomfort in other areas of the upper body.  Symptoms can include pain or discomfort in one or both arms, the back, neck, jaw, or stomach. ? Shortness of breath with or without chest discomfort. ? Other signs may include breaking out in a cold sweat, nausea, or lightheadedness. Don't wait more than five minutes to call [address]! Fast action can save your life. Calling 911 is almost always the fastest way to get lifesaving treatment. Emergency Medical Services staff can begin treatment when they arrive  up to an hour sooner than if someone gets to the hospital by car. The discharge information has been reviewed with the patient. The patient verbalized understanding. Discharge medications reviewed with the patient and appropriate educational materials and side effects teaching were provided. Patient armband removed and shredded. [employer] Activation Thank you for requesting access to [employer]. Please follow the instructions below to securely access and download your online medical record. [employer] allows you to send messages to your doctor, view your test results, renew your prescriptions, schedule appointments, and more. How Do I Sign Up? 1. In your internet browser, go to [email or web address] 
2. Click on the First Time User? Click Here link in the Sign In box. You will be redirect to the New Member Sign Up page. 3. Enter your [employer] Access Code exactly as it appears below. You will not need to use this code after youve completed the sign-up process. If you do not sign up before the expiration date, you must request a new code. [employer] Access Code: Activation code not generated Current [employer] Status: Patient Declined (This is the date your [employer] access code will ) 4. Enter the last four digits of your Social Security Number (xxxx) and Date of Birth (mm/dd/yyyy) as indicated and click Submit. You will be taken to the next sign-up page. 5. Create a [employer] ID.  This will be your [employer] login ID and cannot be changed, so think of one that is secure and easy to remember. 6. Create a E[employer]vo password. You can change your password at any time. 7. Enter your Password Reset Question and Answer. This can be used at a later time if you forget your password. 8. Enter your e-mail address. You will receive e-mail notification when new information is available in [address] 9. Click Sign Up. You can now view and download portions of your medical record. 10. Click the Download Summary menu link to download a portable copy of your medical information. Additional Information If you have questions, please visit the Frequently Asked Questions section of the [employer] website at [email or web address] Remember, [employer] is NOT to be used for urgent needs. For medical emergencies, dial 911. Discharge Orders None General Information Please provide this summary of care documentation to your next provider. Patient Signature:  ____________________________________________________________ Date:  ____________________________________________________________  
  
Karan Hayes Provider Signature:  ____________________________________________________________ Date:  ____________________________________________________________

## 2017-06-08 NOTE — PROGRESS NOTES
I have reviewed discharge instructions with the patient. The patient verbalized understanding. Patient armband removed and shredded. Pt to lobby in her electric wheelchair, Pt waiting for [facility] to transport her home.

## 2017-06-08 NOTE — DISCHARGE INSTRUCTIONS
Left Heart Catheterization: About This Test  What is it? Cardiac catheterization is a test to check the left side of your heart. Your doctor might look at the shape of your heart, the motion of your heart, or the blood pressure inside the chambers. Why is this test done? This test gives information about how your heart is working. It can:  · Check blood flow and blood pressure in the chambers of the heart. · Check the pumping action of the heart. · Find out if a heart defect is present and how severe it is. · Find out how well the heart valves work. What happens during the test?  · You will get medicine to help you relax. · A thin tube called a catheter is put into a blood vessel in the groin or the arm. The doctor moves the catheter through the blood vessel into your heart. · You will get a shot to numb the skin where the catheter goes in. You may feel pressure when the doctor moves the catheter through your blood vessel into your heart. · Dye may be injected into your heart. Your doctor can watch on special monitors as the dye moves in your heart. The dye helps your doctor see blood flow in your heart. · You may feel hot or flushed for several seconds when the dye is put in.  · If a heart defect is found, cardiac catheterization sometimes is used to correct it during the test.  How long does it take? · The test will take about 30 minutes. If a problem is found and the doctor treats it, it can take a few hours longer. What happens after the test?  · You may stay in bed for several hours. · You may or may not need to stay in the hospital overnight. You will get more instructions for what to do at home. · Drink plenty of fluids for several hours after the test.  Follow-up care is a key part of your treatment and safety. Be sure to make and go to all appointments, and call your doctor if you are having problems.  It's also a good idea to know your test results and keep a list of the medicines you take. Where can you learn more? Go to http://romeo-mónica.info/. Enter W306 in the search box to learn more about \"Left Heart Catheterization: About This Test.\"  Current as of: January 27, 2016  Content Version: 11.2  © 4084-3456 [employer]. Care instructions adapted under license by [employer] (which disclaims liability or warranty for this information). If you have questions about a medical condition or this instruction, always ask your healthcare professional. [place] [address] any warranty or liability for your use of this information. DISCHARGE SUMMARY from Nurse    The following personal items are in your possession at time of discharge:       Visual Aid: None           Clothing: With patient                PATIENT INSTRUCTIONS:    After general anesthesia or intravenous sedation, for 24 hours or while taking prescription Narcotics:  · Limit your activities  · Do not drive and operate hazardous machinery  · Do not make important personal or business decisions  · Do  not drink alcoholic beverages  · If you have not urinated within 8 hours after discharge, please contact your surgeon on call. Report the following to your surgeon:  · Excessive pain, swelling, redness or odor of or around the surgical area  · Temperature over 100.5  · Nausea and vomiting lasting longer than 4 hours or if unable to take medications  · Any signs of decreased circulation or nerve impairment to extremity: change in color, persistent  numbness, tingling, coldness or increase pain  · Any questions        What to do at Home:        *  Please give a list of your current medications to your Primary Care Provider. *  Please update this list whenever your medications are discontinued, doses are      changed, or new medications (including over-the-counter products) are added.     *  Please carry medication information at all times in case of emergency situations. These are general instructions for a healthy lifestyle:    No smoking/ No tobacco products/ Avoid exposure to second hand smoke    Surgeon General's Warning:  Quitting smoking now greatly reduces serious risk to your health. Obesity, smoking, and sedentary lifestyle greatly increases your risk for illness    A healthy diet, regular physical exercise & weight monitoring are important for maintaining a healthy lifestyle    You may be retaining fluid if you have a history of heart failure or if you experience any of the following symptoms:  Weight gain of 3 pounds or more overnight or 5 pounds in a week, increased swelling in our hands or feet or shortness of breath while lying flat in bed. Please call your doctor as soon as you notice any of these symptoms; do not wait until your next office visit. Recognize signs and symptoms of STROKE:    F-face looks uneven    A-arms unable to move or move unevenly    S-speech slurred or non-existent    T-time-call 911 as soon as signs and symptoms begin-DO NOT go       Back to bed or wait to see if you get better-TIME IS BRAIN. Warning Signs of HEART ATTACK     Call 911 if you have these symptoms:   Chest discomfort. Most heart attacks involve discomfort in the center of the chest that lasts more than a few minutes, or that goes away and comes back. It can feel like uncomfortable pressure, squeezing, fullness, or pain.  Discomfort in other areas of the upper body. Symptoms can include pain or discomfort in one or both arms, the back, neck, jaw, or stomach.  Shortness of breath with or without chest discomfort.  Other signs may include breaking out in a cold sweat, nausea, or lightheadedness. Don't wait more than five minutes to call 911 - MINUTES MATTER! Fast action can save your life. Calling 911 is almost always the fastest way to get lifesaving treatment.  Emergency Medical Services staff can begin treatment when they arrive -- up to an hour sooner than if someone gets to the hospital by car. The discharge information has been reviewed with the patient. The patient verbalized understanding. Discharge medications reviewed with the patient and appropriate educational materials and side effects teaching were provided. Patient armband removed and shredded. Screen Tonichart Activation    Thank you for requesting access to [employer]. Please follow the instructions below to securely access and download your online medical record. [employer] allows you to send messages to your doctor, view your test results, renew your prescriptions, schedule appointments, and more. How Do I Sign Up? 1. In your internet browser, go to [email or web address]  2. Click on the First Time User? Click Here link in the Sign In box. You will be redirect to the New Member Sign Up page. 3. Enter your CareCloud Access Code exactly as it appears below. You will not need to use this code after youve completed the sign-up process. If you do not sign up before the expiration date, you must request a new code. [employer]Cloud Access Code: Activation code not generated  Current [employer] Status: Patient Declined (This is the date your [employer]Cloud access code will )    4. Enter the last four digits of your Social Security Number (xxxx) and Date of Birth (mm/dd/yyyy) as indicated and click Submit. You will be taken to the next sign-up page. 5. Create a CareCloud ID. This will be your CareCloud login ID and cannot be changed, so think of one that is secure and easy to remember. 6. Create a CareCloud password. You can change your password at any time. 7. Enter your Password Reset Question and Answer. This can be used at a later time if you forget your password. 8. Enter your e-mail address. You will receive e-mail notification when new information is available in [address] 9. Click Sign Up. You can now view and download portions of your medical record.   10. Click the [place] link to download a portable copy of your medical information. Additional Information    If you have questions, please visit the Frequently Asked Questions section of the [employer] website at [email or web address] Remember, [employer] is NOT to be used for urgent needs. For medical emergencies, dial 911.

## 2017-06-08 NOTE — PROGRESS NOTES
Bedside verbal shift change report completed with PARMINDER Tran; will obtain pain med from pharmacy, they have been notified to refill prn order

## 2017-06-08 NOTE — PROGRESS NOTES
Pt to treatment area via wc, pt arrived late due to checking in on the 2nd floor instead of arriving to heart center lobby by mistake; pt gowned, prepped per protocol, chart reviewed, consented; spouse in lobby

## 2017-06-08 NOTE — PROCEDURES
[facility] CATH LAB    Name:  Edwardo Stern  MR#:  [id number]  :  1965  Account #:  [de-identified]  Date of Adm:  2017  Date of Service:  2017      ESTIMATED BLOOD LOSS: none    SPECIMENS REMOVED: none    PROCEDURE PERFORMED BY: Mary Jarvis MD.    NAME OF PROCEDURE: Right and left heart catheterization, coronary  angiogram.    INDICATION: Preoperative evaluation prior to renal transplant. ENTRY: Right common femoral artery, right femoral vein. COMPLICATIONS: None. SEDATION: Moderate sedation was obtained using 2 mg of Versed and  75 mcg of fentanyl. Total sedation time was 75 minutes. PROCEDURE IN DETAIL: The patient was earlier seen in Cardiology  Clinic for preoperative evaluation prior to renal transplant. The patient  with known moderate pulmonary hypertension by right heart  catheterization a few months ago. We decided to proceed with right  and left heart catheterization at the request of the transplant center. The patient had an ALLERGY TO IODINE so she was premedicated  appropriately. The patient was then brought to cardiac catheterization  laboratory. A written witnessed informed consent was obtained from  the patient. The patient was prepped and draped in a sterile fashion. Lidocaine 1% was injected around the right groin and right femoral vein  was accessed using a 6-Egyptian sheath, following which we then  attempted right heart catheterization using a [place] catheter. We were  unable to navigate the catheter to the pulmonary artery. Hence, we  aborted the procedure and exchanged the 6-Egyptian over the wire for a  7-Egyptian sheath, following which we then reattempted right heart  catheterization using a 7-Egyptian [place]ana catheter. We obtained right  atrial and right ventricular pressure. In spite of multiple maneuvers  including wire assistance, we were unable to cross the pulmonary  veins. At this point, we aborted the procedure. Supporting catheter and  wire were removed. We then accessed the right common femoral  artery using a 4-Belarusian sheath. Left coronary angiogram was  performed using a JL catheter. Right coronary angiogram and left heart  catheterization was performed using a JR catheter. The patient  tolerated the procedure. Supporting catheter and wire were removed. The patient was transferred to [facility] area for further management. 1. Right atrial pressure was 12 mmHg. 2. Right ventricular pressure was 43 /11 mmHg. 3. Left ventricular end-diastolic pressure was 20 mmHg. 4. No aortic LV gradient was noted. FINDINGS:  1. Left main is patent and bifurcates into left anterior descending artery  and circumflex artery. 2. Left anterior descending artery has ostial 20% stenosis followed by  a normal caliber vessel. 3. Diagonal 1 and diagonal 2 are medium caliber vessels, appears to  be patent. 4. Circumflex artery is nondominant and appears to be a medium  caliber vessel and is patent. 5. Right coronary artery is a large, dominant vessel and appears to be  normal. It bifurcates into the right posterolateral artery and posterior  descending artery which are patent. CONCLUSION: Normal coronaries. Mildly elevated left ventricular end-  diastolic pressure. Unable to obtain pulmonary artery pressure. We will  obtain echocardiogram to evaluate pulmonary pressures. Meanwhile,  the patient is advised to be on intense medical management and risk  factor modification.         MD RAFAEL Boudreaux / Thomas.Mode  D:  06/08/2017   13:27  T:  06/08/2017   14:19  Job #:  [id number]

## 2017-06-08 NOTE — PROGRESS NOTES
I have reviewed discharge instructions with the patient. The patient verbalized understanding. Patient armband removed and given to patient to take home. Patient was informed of the privacy risks if armband lost or stolen. Pt ambulatory to lobby with steady gait, pt in no apparent distress.

## 2017-06-08 NOTE — PROGRESS NOTES
1314- right femoral 4fr sheath pulled with sterile procedure, vss, pt tolerating well; manual pressure held  1325-7fr right femoral vein sheath pulled, direct pressure held  1335- 4x4 and large tegaderm applied to puncture sites right groin; pt states understanding not to move right leg; oriented to call bell;

## 2017-06-08 NOTE — PROGRESS NOTES
Spoke with Dr. Herrera Dykes via phone, will be delayed, pt to be updated; confirmed pt has been taking pre meds of benadryl and prednisone since Tuesday, last dose this am at 0500.  Pt reports nausea at this time; verbal phone order received for zofran 4mg ivp

## 2017-06-08 NOTE — PROGRESS NOTES
TRANSFER - OUT REPORT:    Verbal report given to AZEEM Lux(name) on Paul Lopez  being transferred to cath lab(unit) for ordered procedure       Report consisted of patients Situation, Background, Assessment and   Recommendations(SBAR). Information from the following report(s) SBAR, Kardex, Intake/Output, MAR and Recent Results was reviewed with the receiving nurse. Lines:   Peripheral IV 06/08/17 Right Forearm (Active)   Site Assessment Clean, dry, & intact 6/8/2017  8:00 AM   Phlebitis Assessment 0 6/8/2017  8:00 AM   Infiltration Assessment 0 6/8/2017  8:00 AM   Dressing Status New 6/8/2017  8:00 AM   Dressing Type Transparent 6/8/2017  8:00 AM   Hub Color/Line Status Blue;Flushed;Patent 6/8/2017  8:00 AM   Action Taken Blood drawn 6/8/2017  8:00 AM   Alcohol Cap Used Yes 6/8/2017  8:00 AM       Peripheral IV 06/08/17 Right Antecubital (Active)   Site Assessment Clean, dry, & intact 6/8/2017  8:00 AM   Phlebitis Assessment 0 6/8/2017  8:00 AM   Infiltration Assessment 0 6/8/2017  8:00 AM   Dressing Status New 6/8/2017  8:00 AM   Dressing Type Transparent 6/8/2017  8:00 AM   Hub Color/Line Status Blue;Flushed;Patent 6/8/2017  8:00 AM   Alcohol Cap Used Yes 6/8/2017  8:00 AM        Opportunity for questions and clarification was provided.       Patient transported with:   [employer]

## 2017-06-13 NOTE — PATIENT INSTRUCTIONS
Please contact our office if you have any questions about your visit today. 1.) Fasting Blood Sugar Goal:  mg/dL    2.) Non-Fasting Blood Sugar Goal: 150-170 mg/dL.

## 2017-06-13 NOTE — MR AVS SNAPSHOT
Visit Information Date & Time Provider Department Dept. Phone Encounter #  
 6/13/2017 10:30 AM [facility] [phone] [id number] Follow-up Instructions Return in about 2 months (around 8/13/2017) for Follow Up . Upcoming Health Maintenance Date Due DTaP/Tdap/Td series (1 - Tdap) 7/1/1988 Pneumococcal 19-64 Highest Risk (3 of 3 - PCV13) 5/20/2017 INFLUENZA AGE 9 TO ADULT 8/1/2017 PAP AKA CERVICAL CYTOLOGY 2/20/2020 Allergies as of 6/13/2017  Review Complete On: 9/38/7404 By: Damian Mckeon. Rubén Vizcaino LPN Severity Noted Reaction Type Reactions Contrast Agent [Iodine] High 05/13/2015    Angioedema Heparin Analogues High 05/20/2015   Side Effect Other (comments) Thrombocytopenia, positive HIT panel (5/15/15) Prednisone  05/13/2015    Other (comments) Became comatosed Current Immunizations  Reviewed on 5/17/2015 No immunizations on file. Not reviewed this visit You Were Diagnosed With   
  
 Codes Comments Chronic obstructive pulmonary disease, unspecified COPD type ([facility]     ICD-10-CM: J44.9 ICD-9-CM: 455 Exocrine pancreatic insufficiency     ICD-10-CM: K86.81 
ICD-9-CM: 577.8 Vitals BP Pulse Temp Resp Height(growth percentile) Weight(growth percentile) 131/80 (BP 1 Location: Right arm, BP Patient Position: Sitting) 84 100.1 °F (37.8 °C) (Oral) 17 5' 8\" (1.727 m) 150 lb (68 kg) SpO2 BMI OB Status Smoking Status 100% 22.81 kg/m2 Medically Induced Former Smoker BMI and BSA Data Body Mass Index Body Surface Area  
 22.81 kg/m 2 1.81 m 2 Preferred Pharmacy Pharmacy Name Phone [id number], [address] Your Updated Medication List  
  
   
This list is accurate as of: 6/13/17 11:43 AM.  Always use your most recent med list.  
  
  
  
  
 albuterol 90 mcg/actuation inhaler Commonly known as:  PROVENTIL HFA, VENTOLIN HFA, PROAIR HFA Take 2 Puffs by inhalation every six (6) hours as needed. amLODIPine 10 mg tablet Commonly known as:  NORVASC  
take 1 tablet by mouth once daily  
  
 amylase-lipase-protease 12,000-38,000 -60,000 unit capsule Commonly known as:  CREON Take 1 Cap by mouth three (3) times daily (with meals). aspirin delayed-release 81 mg tablet Take  by mouth daily. Blood-Glucose Meter monitoring kit Check blood fasting blood sugar and also after largest meal of the day CARAFATE 100 mg/mL suspension Generic drug:  sucralfate TAKE 10 ML BY MOUTH FOUR TIMES DAILY BEFORE MEALS AND AT BEDTIME  
  
 carvedilol 25 mg tablet Commonly known as:  COREG  
TAKE 1 BY MOUTH TWO TIMES  DAILY WITH MEALS  
  
 ergocalciferol 50,000 unit capsule Commonly known as:  ERGOCALCIFEROL Take 1 Cap by mouth every seven (7) days. glucose blood VI test strips strip Commonly known as:  blood glucose test  
Check Fasting Blood Sugar and after largest meal of the day  
  
 lancets 32 gauge Misc  
100 Each by Does Not Apply route two (2) times daily (with meals). LORazepam 0.5 mg tablet Commonly known as:  ATIVAN Take 1 Tab by mouth two (2) times daily as needed for Anxiety. Max Daily Amount: 1 mg. promethazine 25 mg tablet Commonly known as:  PHENERGAN Take 1 Tab by mouth every six (6) hours as needed for Nausea. MIROSLAVA-IAIN PO Take  by mouth. ROXICODONE 15 mg immediate release tablet Generic drug:  oxyCODONE IR Take 15 mg by mouth two (2) times daily as needed. sevelamer carbonate 800 mg Tab tab Commonly known as:  Stanley Flood Take 3 Tabs by mouth three (3) times daily (with meals). umeclidinium-vilanterol 62.5-25 mcg/actuation inhaler Commonly known as:  Shameka Edgar Take 1 Puff by inhalation daily. Indications: BRONCHOSPASM PREVENTION WITH COPD  
  
 ZOFRAN (AS HYDROCHLORIDE) 4 mg tablet Generic drug:  ondansetron hcl Take 4 mg by mouth every eight (8) hours as needed. Prescriptions Sent to Pharmacy Refills  
 albuterol (PROVENTIL HFA, VENTOLIN HFA, PROAIR HFA) 90 mcg/actuation inhaler 6 Sig: Take 2 Puffs by inhalation every six (6) hours as needed. Class: Normal  
 Pharmacy: [facility], [address] [place] [address] Ph #: [phone] Route: Inhalation  
 umeclidinium-vilanterol (ANORO ELLIPTA) 62.5-25 mcg/actuation inhaler 6 Sig: Take 1 Puff by inhalation daily. Indications: BRONCHOSPASM PREVENTION WITH COPD Class: Normal  
 Pharmacy: [address] [place] [address], [address] [place] [address] Ph #: [phone] Route: Inhalation  
 promethazine (PHENERGAN) 25 mg tablet 3 Sig: Take 1 Tab by mouth every six (6) hours as needed for Nausea. Class: Normal  
 Pharmacy: [address] [place] [address], [address] [place] [address] Ph #: [phone] Route: Oral  
  
Follow-up Instructions Return in about 2 months (around 8/13/2017) for Follow Up . Patient Instructions Please contact our office if you have any questions about your visit today. 1.) Fasting Blood Sugar Goal:  mg/dL 
 
2.) Non-Fasting Blood Sugar Goal: 150-170 mg/dL. Please provide this summary of care documentation to your next provider. Your primary care clinician is listed as John Mcclelland. If you have any questions after today's visit, please call [phone].

## 2017-06-13 NOTE — PROGRESS NOTES
Chief Complaint   Patient presents with    End Stage Renal Disease     dialysis MWF    Hypertension     1. Have you been to the ER, urgent care clinic since your last visit? Hospitalized since your last visit? No    2. Have you seen or consulted any other health care providers outside of the [address] [place] [address] since your last visit? Include any pap smears or colon screening.  No

## 2017-06-13 NOTE — PROGRESS NOTES
History and Physical    Patient: Eddie Calhoun MRN: [id number]  SSN: [id number]    YOB: 1965  Age: 54 y.o. Sex: female      Subjective:      Eddie Calhoun is a 54 y.o. female who is here for follow up. The patient has been feeling very nauseated of late and has been having wild fluctuations in her blood sugars. She is concerned about managing her blood sugars well. The patient states that she was referred by her GI specialist for another evaluation for her pancreatic pseudo-cysts. She states that she will be traveling to [place] for this. Patient also was seen in the hospital for cardiac catheterization by her cardiologist. The patient is planned for a revision of her fistula. The patient states that she had an episode of hypotension because they took too much fluid off of her. The patient would also like to get refills on some of her maintenance medication. Past Medical History:   Diagnosis Date    Cardiomegaly 10/1/2013    Chronic diastolic heart failure ([address] 10/1/2013    sob on activity     Chronic diastolic heart failure (HCC)     Chronic kidney disease     dialysis    Chronic obstructive pulmonary disease ([address] 10/1/2013    Chronic pain     Cirrhosis ([address]     Dialysis patient ([address] 12/17/2012    ESRD (end stage renal disease) ([address]     ESRD (end stage renal disease) ([address] 7/27/2016    HD since 9/11     Essential hypertension, benign 10/1/2013    severly elevated     Essential hypertension, benign 10/1/2013    severly elevated     Hypertension     Lupus ([address]     Mitral valve disorders 10/1/2013    Mitral valve disorders 10/1/2013    On home oxygen therapy     3 liters /min NC    Pancreatitis     Pancreatitis chronic     Precordial pain     Stroke ([address]     1998    Tricuspid valve disorders, specified as nonrheumatic 10/1/2013   SLE diagnosed in 2012 by Dr. Gregoria Bueno      Review of Systems:  Pertinent items are noted in the History of Present Illness.     Objective: Visit Vitals    /80 (BP 1 Location: Right arm, BP Patient Position: Sitting)    Pulse 84    Temp 100.1 °F (37.8 °C) (Oral)    Resp 17    Ht 5' 8\" (1.727 m)    Wt 150 lb (68 kg)    SpO2 100%    BMI 22.81 kg/m2         Physical Exam:  GENERAL: alert, cooperative, no distress, appears stated age  EYE: conjunctivae/corneas clear. PERRL, EOM's intact. Fundi benign  LYMPHATIC: Cervical, supraclavicular, and axillary nodes normal.   THROAT & NECK: normal, no erythema or exudates noted. and poor dentition   LUNG: clear to auscultation bilaterally  HEART: regular rate and rhythm, S1, S2 normal, no murmur, click, rub or gallop  ABDOMEN: soft, non-tender. Bowel sounds normal. No masses,  no organomegaly, abnormal findings:  Mildly obese  EXTREMITIES:  Fistulae noted in both arms bilaterally. Hematoma noted above fistula in left arm---warm to touch. SKIN: Multiple scars on upper extremity from previous fistulae       Labs:     Lab Results   Component Value Date/Time    Vitamin D 25-Hydroxy 11.6 11/22/2016 01:39 PM           Lab Results   Component Value Date/Time    WBC 4.0 06/08/2017 08:10 AM    HGB 11.9 06/08/2017 08:10 AM    HCT 36.4 06/08/2017 08:10 AM    PLATELET 175 84/83/2876 08:10 AM    MCV 85.2 06/08/2017 08:10 AM     Lab Results   Component Value Date/Time    Sodium 130 06/08/2017 08:10 AM    Potassium 5.7 06/08/2017 08:10 AM    Chloride 90 06/08/2017 08:10 AM    CO2 34 06/08/2017 08:10 AM    Anion gap 6 06/08/2017 08:10 AM    Glucose 136 06/08/2017 08:10 AM    BUN 60 06/08/2017 08:10 AM    Creatinine 5.58 06/08/2017 08:10 AM    BUN/Creatinine ratio 11 06/08/2017 08:10 AM    GFR est AA 10 06/08/2017 08:10 AM    GFR est non-AA 8 06/08/2017 08:10 AM    Calcium 10.0 06/08/2017 08:10 AM    AST (SGOT) 15 04/28/2017 11:58 AM    Alk.  phosphatase 295 04/28/2017 11:58 AM    Protein, total 8.9 04/28/2017 11:58 AM    Albumin 3.6 04/28/2017 11:58 AM    Globulin 5.3 04/28/2017 11:58 AM    A-G Ratio 0.7 04/28/2017 11:58 AM    ALT (SGPT) 15 04/28/2017 11:58 AM         Assessment:     1.) Nausea and Vomiting: Patient ordered phenergan for use as needed. 2.) Exocrine Pancreatic Insufficiency w/ history of pancreatic cyst: Patient was instructed that blood sugar fasting levels should be between  mg/dL and also a range of 150-170 mg/dL for non-fasting. Patient will continue using glucometer and test strips ordered to assess for any hypoglycemic episodes. She will continue to follow with Dr. Modesta Cosby. 3.) End Stage Renal Disease on Hemodialysis: Patient will continue on dialysis M, W, F.     4.) COPD: Patient reordered Anoro and albuterol. Patient will return in 2 months for follow up.     Plan:     Orders Placed This Encounter    albuterol (PROVENTIL HFA, VENTOLIN HFA, PROAIR HFA) 90 mcg/actuation inhaler    umeclidinium-vilanterol (ANORO ELLIPTA) 62.5-25 mcg/actuation inhaler    promethazine (PHENERGAN) 25 mg tablet           Signed By: [id number] Parth Yañez,      June 13, 2017

## 2017-07-17 NOTE — MR AVS SNAPSHOT
Visit Information Date & Time Provider Department Dept. Phone Encounter #  
 7/17/2017  2:15 PM Jenna Peters 77 [id number] Follow-up Instructions Return for Patient will keep August 15th appointment . Your Appointments 8/15/2017 10:15 AM  
Follow Up with Cande Yañez DO [address] (--) Appt Note: Follow up Mattie [address] [place] [id number] [phone]  
  
   
 [place] [phone] 8/16/2017 11:30 AM  
Follow Up with Sharmaine Elkins MD  
[facility] ([address] [facility]) Appt Note: Post cath follow up; Post cath follow up  
 [address] [facility] [address]  
  
   
 [address] [place] [id number] Upcoming Health Maintenance Date Due DTaP/Tdap/Td series (1 - Tdap) 7/1/1988 Pneumococcal 19-64 Highest Risk (3 of 3 - PCV13) 5/20/2017 FOBT Q 1 YEAR AGE 50-75 7/1/2017 INFLUENZA AGE 9 TO ADULT 8/1/2017 BREAST CANCER SCRN MAMMOGRAM 1/25/2019 PAP AKA CERVICAL CYTOLOGY 2/20/2020 Allergies as of 7/17/2017  Review Complete On: 7/17/2017 By: [id number] Yañez DO Severity Noted Reaction Type Reactions Contrast Agent [Iodine] High 05/13/2015    Angioedema Heparin Analogues High 05/20/2015   Side Effect Other (comments) Thrombocytopenia, positive HIT panel (5/15/15) Prednisone  05/13/2015    Other (comments) Became comatosed Current Immunizations  Reviewed on 5/17/2015 No immunizations on file. Not reviewed this visit You Were Diagnosed With   
  
 Codes Comments Jerking movements of extremities    -  Primary ICD-10-CM: R25.2 ICD-9-CM: 781.0 Muscle spasm     ICD-10-CM: N82.705 ICD-9-CM: 728.85 ESRD (end stage renal disease) [facility]     ICD-10-CM: N18.6, Z99.2 ICD-9-CM: 585.6, V45.11   
 Nausea and vomiting, intractability of vomiting not specified, unspecified vomiting type     ICD-10-CM: R11.2 ICD-9-CM: 787.01 Vitals BP Pulse Temp Resp Height(growth percentile) Weight(growth percentile) 137/87 (BP 1 Location: Right arm, BP Patient Position: Sitting) 78 100.1 °F (37.8 °C) (Oral) 16 5' 8\" (1.727 m) 148 lb 8 oz (67.4 kg) BMI OB Status Smoking Status 22.58 kg/m2 Medically Induced Former Smoker BMI and BSA Data Body Mass Index Body Surface Area  
 22.58 kg/m 2 1.8 m 2 Preferred Pharmacy Pharmacy Name Phone [phone] [facility], [address] Your Updated Medication List  
  
   
This list is accurate as of: 7/17/17  3:18 PM.  Always use your most recent med list.  
  
  
  
  
 albuterol 90 mcg/actuation inhaler Commonly known as:  PROVENTIL HFA, VENTOLIN HFA, PROAIR HFA Take 2 Puffs by inhalation every six (6) hours as needed. amLODIPine 10 mg tablet Commonly known as:  NORVASC  
take 1 tablet by mouth once daily  
  
 amylase-lipase-protease 12,000-38,000 -60,000 unit capsule Commonly known as:  CREON Take 1 Cap by mouth three (3) times daily (with meals). aspirin delayed-release 81 mg tablet Take  by mouth daily. Blood-Glucose Meter monitoring kit Check blood fasting blood sugar and also after largest meal of the day CARAFATE 100 mg/mL suspension Generic drug:  sucralfate TAKE 10 ML BY MOUTH FOUR TIMES DAILY BEFORE MEALS AND AT BEDTIME  
  
 carvedilol 25 mg tablet Commonly known as:  COREG  
TAKE 1 BY MOUTH TWO TIMES  DAILY WITH MEALS  
  
 ergocalciferol 50,000 unit capsule Commonly known as:  ERGOCALCIFEROL Take 1 Cap by mouth every seven (7) days. glucose blood VI test strips strip Commonly known as:  blood glucose test  
Check Fasting Blood Sugar and after largest meal of the day  
  
 lancets 32 gauge Misc 100 Each by Does Not Apply route two (2) times daily (with meals). LORazepam 0.5 mg tablet Commonly known as:  ATIVAN Take 1 Tab by mouth two (2) times daily as needed for Anxiety. Max Daily Amount: 1 mg. promethazine 25 mg tablet Commonly known as:  PHENERGAN Take 1 Tab by mouth every six (6) hours as needed for Nausea. MIROSLAVA-IAIN PO Take  by mouth. ROXICODONE 15 mg immediate release tablet Generic drug:  oxyCODONE IR Take 15 mg by mouth two (2) times daily as needed. sevelamer carbonate 800 mg Tab tab Commonly known as:  Usha Jabier Take 3 Tabs by mouth three (3) times daily (with meals). trimethobenzamide 300 mg capsule Commonly known as:  Janel Fuelling Take 1 Cap by mouth two (2) times daily as needed. umeclidinium-vilanterol 62.5-25 mcg/actuation inhaler Commonly known as:  Kimmy Marco A Take 1 Puff by inhalation daily. Indications: BRONCHOSPASM PREVENTION WITH COPD  
  
 ZOFRAN (AS HYDROCHLORIDE) 4 mg tablet Generic drug:  ondansetron hcl Take 4 mg by mouth every eight (8) hours as needed. Prescriptions Printed Refills  
 trimethobenzamide (TIGAN) 300 mg capsule 0 Sig: Take 1 Cap by mouth two (2) times daily as needed. Class: Print Route: Oral  
  
Follow-up Instructions Return for Patient will keep August 15th appointment . To-Do List   
 07/17/2017 Lab:  CALCIUM, IONIZED   
  
 07/17/2017 Lab:  MAGNESIUM   
  
 07/17/2017 Lab:  PHOSPHORUS Around 10/15/2017 Lab:  METABOLIC PANEL, COMPREHENSIVE Patient Instructions Please contact our office if you have any questions about your visit today. Chronic Kidney Disease: Care Instructions Your Care Instructions Chronic kidney disease happens when your kidneys don't work as well as they should. Your kidneys have a few important jobs. They remove waste from your blood. This waste leaves your body in your urine.  They also balance your body's fluids and chemicals. When your kidneys don't work well, extra waste and fluid can build up. This can poison the body and sometimes cause death. The most common causes of this disease are diabetes and high blood pressure. In some cases, the disease develops in 2 to 3 months. But it usually develops over many years. If you take medicine and make healthy changes to your lifestyle, you may be able to prevent the disease from getting worse. But if your kidney damage gets worse, you may need dialysis or a kidney transplant. Dialysis uses a machine to filter waste from the blood. A transplant is surgery to give you a healthy kidney from another person. Follow-up care is a key part of your treatment and safety. Be sure to make and go to all appointments, and call your doctor if you are having problems. It's also a good idea to know your test results and keep a list of the medicines you take. How can you care for yourself at home? Treatments and appointments · Be safe with medicines. Take your medicines exactly as prescribed. Call your doctor if you have any problems with your medicine. You also may take medicine to control your blood pressure or to treat diabetes. Many people who have diabetes take blood pressure medicine. · If you have diabetes, do your best to keep your blood sugar in your target range. You may do this by eating healthy food and exercising. You may also take medicines. · Go to your dialysis appointments if you have this treatment. · Do not take ibuprofen (Advil, Motrin), naproxen (Aleve), or similar medicines, unless your doctor tells you to. These may make the disease worse. · Do not take any vitamins, over-the-counter medicines, or herbal products without talking to your doctor first. 
· Do not smoke or use other tobacco products. Smoking can reduce blood flow to the kidneys.  If you need help quitting, talk to your doctor about stop-smoking programs and medicines. These can increase your chances of quitting for good. · Do not drink alcohol or use illegal drugs. · Talk to your doctor about an exercise plan. Exercise helps lower your blood pressure. It also makes you feel better. · If you have an advance directive, let your doctor know. It may include a living will and a durable power of  for health care. If you don't have one, you may want to prepare one. It lets your doctor and loved ones know your health care wishes if you become unable to speak for yourself. Diet · Talk to a registered dietitian. He or she can help you make a meal plan that is right for you. Most people with kidney disease need to limit salt (sodium), fluids, and protein. Some also have to limit potassium and phosphorus. · You may have to give up many foods you like. But try to focus on the fact that this will help you stay healthy for as long as possible. · If you have a hard time eating enough, talk to your doctor or dietitian about ways to add calories to your diet. · Your diet may change as your disease changes. See your doctor for regular testing. And work with a dietitian to change your diet as needed. When should you call for help? Call 911 anytime you think you may need emergency care. For example, call if: 
· You passed out (lost consciousness). Call your doctor now or seek immediate medical care if: 
· You have less urine than normal or no urine. · You have trouble urinating or can urinate only very small amounts. · You are confused or have trouble thinking clearly. · You feel weaker or more tired than usual. 
· You are very thirsty, lightheaded, or dizzy. · You have nausea and vomiting. · You have new swelling of your arms or feet, or your swelling is worse. · You have blood in your urine. · You have new or worse trouble breathing. Watch closely for changes in your health, and be sure to contact your doctor if: · You have any problems with your medicine or other treatment. Where can you learn more? Go to http://romeo-mónica.info/. Enter N276 in the search box to learn more about \"Chronic Kidney Disease: Care Instructions. \" Current as of: April 3, 2017 Content Version: 11.3 © 7156-4330 [employer]. Care instructions adapted under license by [employer] (which disclaims liability or warranty for this information). If you have questions about a medical condition or this instruction, always ask your healthcare professional. [address] any warranty or liability for your use of this information. Please provide this summary of care documentation to your next provider. Your primary care clinician is listed as Binta Navarro. If you have any questions after today's visit, please call [phone].

## 2017-07-17 NOTE — PROGRESS NOTES
Progress Note    Patient: Jerod Adhikari MRN: [id number]  SSN: [id number]    YOB: 1965  Age: 50 y.o. Sex: female          Subjective:   Jerod Adhikari is a 50 y.o. female who is here for ER visit. The patient was seen for jerking motions that occurred while she was on dialysis. This incident occurred last week Monday. She states that the went to [facility] and everything was fine. She was later seen at [facility] and they did a full work up including lab work and CT, which were all normal. She states that they did provide her with Benadryl to help with nausea. She was also advised to back off the phenergan due to side effects. She has since been taking her Benadryl every 4 hours. She states that the Benadryl has helped with the symptoms. She states that she has not had any jerking motions today. She states that she takes Benadryl 2 every 4 hours. Patient mentions that she does not have to use her phenergan as much since the initial incident occurred.        Objective:     Past Medical History:   Diagnosis Date    Cardiomegaly 10/1/2013    Chronic diastolic heart failure ([address] 10/1/2013    sob on activity     Chronic diastolic heart failure (HCC)     Chronic kidney disease     dialysis    Chronic obstructive pulmonary disease ([address] 10/1/2013    Chronic pain     Cirrhosis ([address]     Dialysis patient ([address] 12/17/2012    ESRD (end stage renal disease) ([address]     ESRD (end stage renal disease) ([address] 7/27/2016    HD since 9/11     Essential hypertension, benign 10/1/2013    severly elevated     Essential hypertension, benign 10/1/2013    severly elevated     Hypertension     Lupus ([address]     Mitral valve disorders 10/1/2013    Mitral valve disorders 10/1/2013    On home oxygen therapy     3 liters /min NC    Pancreatitis     Pancreatitis chronic     Precordial pain     Stroke ([address]     1998    Tricuspid valve disorders, specified as nonrheumatic 10/1/2013        Vitals:    07/17/17 1447   BP: 137/87   Pulse: 78   Resp: 16   Temp: 100.1 °F (37.8 °C)   TempSrc: Oral   Weight: 148 lb 8 oz (67.4 kg)   Height: 5' 8\" (1.727 m)            Review of Systems:  Pertinent items are noted in the History of Present Illness. Physical Exam:   GENERAL: alert, cooperative, no distress, appears stated age  EYE: conjunctivae/corneas clear. PERRL, EOM's intact. Fundi benign  LYMPHATIC: Cervical, supraclavicular, and axillary nodes normal.   THROAT & NECK: normal, no erythema or exudates noted. and poor dentition   LUNG: clear to auscultation bilaterally  HEART: regular rate and rhythm, S1, S2 normal, no murmur, click, rub or gallop  ABDOMEN: soft, non-tender. Bowel sounds normal. No masses,  no organomegaly, abnormal findings:  Mildly obese  EXTREMITIES:  Fistulae noted in both arms bilaterally. Hematoma noted above fistula in left arm---warm to touch. SKIN: Multiple scars on upper extremity from previous fistulae       Lab/Data Review:  Labs ordered as noted below      Assessment:     1.) Jerking of Muscles: Magnesium, Phosphorus and Ionized Calcium ordered to assess if there is any other cause     2.) Nausea and Vomiting: Patient will stop phenergan due to side effects. I will instead place the patient on Tigan for use as needed. 2.) ESRD: Patient will keep regular dialysis appointments. Patient will keep August appointment.      Plan:     Orders Placed This Encounter    MAGNESIUM    PHOSPHORUS    CALCIUM, IONIZED    METABOLIC PANEL, COMPREHENSIVE    DISCONTD: trimethobenzamide (TIGAN) 300 mg capsule    DISCONTD: trimethobenzamide (TIGAN) 300 mg capsule    trimethobenzamide (TIGAN) 300 mg capsule         Signed By: Moises Espinosa DO     July 17, 2017

## 2017-07-17 NOTE — PROGRESS NOTES
Chief Complaint   Patient presents with   [facility] Follow Up     Seen at [place] [address] ER on 7/10/17, she states an hour before finishing dialysis tx she started experiencing 'body jerks'. She was taken off when she had 7 minutes left. She drove herself to the ER. She states all testing came back fine but she was still twitching. The next day she went to [facility]. She was told that it could have been caused by phenergan and advised not to take so much of it. Health Maintenance Due   Topic Date Due    DTaP/Tdap/Td series (1 - Tdap) 07/01/1988    Pneumococcal 19-64 Highest Risk (3 of 3 - PCV13) 05/20/2017    FOBT Q 1 YEAR AGE 50-75  07/01/2017       Health Maintenance reviewed     1. Have you been to the ER, urgent care clinic since your last visit? Hospitalized since your last visit? Yes When: 7/17 Where: [place] [address] ER and [facility] Reason for visit: body jerking    2. Have you seen or consulted any other health care providers outside of the [address] [place] [address] since your last visit? Include any pap smears or colon screening.  No

## 2017-07-17 NOTE — PATIENT INSTRUCTIONS
Please contact our office if you have any questions about your visit today. Chronic Kidney Disease: Care Instructions  Your Care Instructions  Chronic kidney disease happens when your kidneys don't work as well as they should. Your kidneys have a few important jobs. They remove waste from your blood. This waste leaves your body in your urine. They also balance your body's fluids and chemicals. When your kidneys don't work well, extra waste and fluid can build up. This can poison the body and sometimes cause death. The most common causes of this disease are diabetes and high blood pressure. In some cases, the disease develops in 2 to 3 months. But it usually develops over many years. If you take medicine and make healthy changes to your lifestyle, you may be able to prevent the disease from getting worse. But if your kidney damage gets worse, you may need dialysis or a kidney transplant. Dialysis uses a machine to filter waste from the blood. A transplant is surgery to give you a healthy kidney from another person. Follow-up care is a key part of your treatment and safety. Be sure to make and go to all appointments, and call your doctor if you are having problems. It's also a good idea to know your test results and keep a list of the medicines you take. How can you care for yourself at home? Treatments and appointments  · Be safe with medicines. Take your medicines exactly as prescribed. Call your doctor if you have any problems with your medicine. You also may take medicine to control your blood pressure or to treat diabetes. Many people who have diabetes take blood pressure medicine. · If you have diabetes, do your best to keep your blood sugar in your target range. You may do this by eating healthy food and exercising. You may also take medicines. · Go to your dialysis appointments if you have this treatment.   · Do not take ibuprofen (Advil, Motrin), naproxen (Aleve), or similar medicines, unless your doctor tells you to. These may make the disease worse. · Do not take any vitamins, over-the-counter medicines, or herbal products without talking to your doctor first.  · Do not smoke or use other tobacco products. Smoking can reduce blood flow to the kidneys. If you need help quitting, talk to your doctor about stop-smoking programs and medicines. These can increase your chances of quitting for good. · Do not drink alcohol or use illegal drugs. · Talk to your doctor about an exercise plan. Exercise helps lower your blood pressure. It also makes you feel better. · If you have an advance directive, let your doctor know. It may include a living will and a durable power of  for health care. If you don't have one, you may want to prepare one. It lets your doctor and loved ones know your health care wishes if you become unable to speak for yourself. Diet  · Talk to a registered dietitian. He or she can help you make a meal plan that is right for you. Most people with kidney disease need to limit salt (sodium), fluids, and protein. Some also have to limit potassium and phosphorus. · You may have to give up many foods you like. But try to focus on the fact that this will help you stay healthy for as long as possible. · If you have a hard time eating enough, talk to your doctor or dietitian about ways to add calories to your diet. · Your diet may change as your disease changes. See your doctor for regular testing. And work with a dietitian to change your diet as needed. When should you call for help? Call 911 anytime you think you may need emergency care. For example, call if:  · You passed out (lost consciousness). Call your doctor now or seek immediate medical care if:  · You have less urine than normal or no urine. · You have trouble urinating or can urinate only very small amounts. · You are confused or have trouble thinking clearly.   · You feel weaker or more tired than usual.  · You are very thirsty, lightheaded, or dizzy. · You have nausea and vomiting. · You have new swelling of your arms or feet, or your swelling is worse. · You have blood in your urine. · You have new or worse trouble breathing. Watch closely for changes in your health, and be sure to contact your doctor if:  · You have any problems with your medicine or other treatment. Where can you learn more? Go to http://romeo-mónica.info/. Enter N276 in the search box to learn more about \"Chronic Kidney Disease: Care Instructions. \"  Current as of: April 3, 2017  Content Version: 11.3  © 2550-8499 [employer]. Care instructions adapted under license by [employer] (which disclaims liability or warranty for this information). If you have questions about a medical condition or this instruction, always ask your healthcare professional. [address] any warranty or liability for your use of this information.

## 2017-09-05 NOTE — MR AVS SNAPSHOT
Visit Information Date & Time Provider Department Dept. Phone Encounter #  
 9/5/2017 11:00 AM Edmar Paul Jenna Jonathan Ley [phone] [id number] Follow-up Instructions Return in about 3 months (around 12/5/2017) for Regular Follow Up . Your Appointments 9/28/2017 11:45 AM  
Follow Up with Maya Goddard MD  
[facility] ([address]) Appt Note: Post cath follow up; Post cath follow up; Post cath follow up; pt confirmed/jg; Post cath follow up/pt r/s due to transportation not showing up [address] [facility] [address]  
  
   
 [address] [address] Upcoming Health Maintenance Date Due DTaP/Tdap/Td series (1 - Tdap) 7/1/1988 Pneumococcal 19-64 Highest Risk (3 of 3 - PCV13) 5/20/2017 FOBT Q 1 YEAR AGE 50-75 7/1/2017 INFLUENZA AGE 9 TO ADULT 8/1/2017 BREAST CANCER SCRN MAMMOGRAM 1/25/2019 PAP AKA CERVICAL CYTOLOGY 2/20/2020 Allergies as of 9/5/2017  Review Complete On: 7/17/2017 By: Edmar Paul DO Severity Noted Reaction Type Reactions Contrast Agent [Iodine] High 05/13/2015    Angioedema Heparin Analogues High 05/20/2015   Side Effect Other (comments) Thrombocytopenia, positive HIT panel (5/15/15) Prednisone  05/13/2015    Other (comments) Became comatosed Current Immunizations  Reviewed on 5/17/2015 No immunizations on file. Not reviewed this visit You Were Diagnosed With   
  
 Codes Comments Exocrine pancreatic insufficiency    -  Primary ICD-10-CM: M82.35 
ICD-9-CM: 577.8 Benign hypertension with ESRD (end-stage renal disease) ([facility]     ICD-10-CM: I12.0, N18.6 ICD-9-CM: 403.11, 585.6 COPD mixed type [facility])     ICD-10-CM: J44.9 ICD-9-CM: 667 Systemic lupus erythematosus, unspecified SLE type, unspecified organ involvement status ([facility]     ICD-10-CM: M32.9 ICD-9-CM: 710.0 Vitamin D deficiency     ICD-10-CM: E55.9 ICD-9-CM: 268.9 Vitals BP Pulse Temp Resp Height(growth percentile) Weight(growth percentile) (!) 182/114 (BP 1 Location: Right arm, BP Patient Position: Sitting) 92 98.7 °F (37.1 °C) (Oral) 20 5' 8\" (1.727 m) 152 lb 8 oz (69.2 kg) BMI OB Status Smoking Status 23.19 kg/m2 Medically Induced Former Smoker BMI and BSA Data Body Mass Index Body Surface Area  
 23.19 kg/m 2 1.82 m 2 Preferred Pharmacy Pharmacy Name Phone [address] [facility], [address] Your Updated Medication List  
  
   
This list is accurate as of: 9/5/17 12:10 PM.  Always use your most recent med list.  
  
  
  
  
 albuterol 90 mcg/actuation inhaler Commonly known as:  PROVENTIL HFA, VENTOLIN HFA, PROAIR HFA Take 2 Puffs by inhalation every six (6) hours as needed. amLODIPine 10 mg tablet Commonly known as:  Erica Moorland TAKE 1 BY MOUTH EVERY DAY  
  
 aspirin delayed-release 81 mg tablet Take  by mouth daily. Blood-Glucose Meter monitoring kit Check blood fasting blood sugar and also after largest meal of the day CARAFATE 100 mg/mL suspension Generic drug:  sucralfate TAKE 10 ML BY MOUTH FOUR TIMES DAILY BEFORE MEALS AND AT BEDTIME  
  
 carvedilol 25 mg tablet Commonly known as:  COREG  
TAKE 1 BY MOUTH TWO TIMES  DAILY WITH MEALS  
  
 ergocalciferol 50,000 unit capsule Commonly known as:  ERGOCALCIFEROL Take 1 Cap by mouth every seven (7) days. glucose blood VI test strips strip Commonly known as:  blood glucose test  
Check Fasting Blood Sugar and after largest meal of the day  
  
 lancets 32 gauge Misc  
100 Each by Does Not Apply route two (2) times daily (with meals). lipase-protease-amylase 12,000-38,000 -60,000 unit capsule Commonly known as:  CREON Take 1 Cap by mouth three (3) times daily (with meals). LORazepam 0.5 mg tablet Commonly known as:  ATIVAN Take 1 Tab by mouth two (2) times daily as needed for Anxiety. Max Daily Amount: 1 mg. MIROSLAVA-IAIN PO Take  by mouth. ROXICODONE 15 mg immediate release tablet Generic drug:  oxyCODONE IR Take 15 mg by mouth two (2) times daily as needed. sevelamer carbonate 800 mg Tab tab Commonly known as:  Marsa Opal Take 3 Tabs by mouth three (3) times daily (with meals). trimethobenzamide 300 mg capsule Commonly known as:  TIGAN  
take 1 capsule by mouth twice a day if needed  
  
 umeclidinium-vilanterol 62.5-25 mcg/actuation inhaler Commonly known as:  Ferne Amanda Take 1 Puff by inhalation daily. Indications: BRONCHOSPASM PREVENTION WITH COPD  
  
 ZOFRAN (AS HYDROCHLORIDE) 4 mg tablet Generic drug:  ondansetron hcl Take 4 mg by mouth every eight (8) hours as needed. Follow-up Instructions Return in about 3 months (around 12/5/2017) for Regular Follow Up . To-Do List   
 09/05/2017 Lab:  VITAMIN D, 25 HYDROXY Patient Instructions Please contact our office if you have any questions about your visit today. 1.) Please get labs a week before next visit 2.) Return in 3 months for follow up Please provide this summary of care documentation to your next provider. Your primary care clinician is listed as Alice Ashton. If you have any questions after today's visit, please call [phone].

## 2017-09-05 NOTE — PROGRESS NOTES
Chief Complaint   Patient presents with    Spasms     muscle jerking    End Stage Renal Disease    Results     discuss lab results       Health Maintenance Due   Topic Date Due    DTaP/Tdap/Td series (1 - Tdap) 07/01/1988    Pneumococcal 19-64 Highest Risk (3 of 3 - PCV13) 05/20/2017    FOBT Q 1 YEAR AGE 50-75  07/01/2017    INFLUENZA AGE 9 TO ADULT  08/01/2017       Health Maintenance reviewed     1. Have you been to the ER, urgent care clinic since your last visit? Hospitalized since your last visit? Yes When: 8/17 Where: [facility] ER Reason for visit: twitching    2. Have you seen or consulted any other health care providers outside of the [address] [place] [address] since your last visit? Include any pap smears or colon screening.  No

## 2017-09-05 NOTE — PROGRESS NOTES
Progress Note    Patient: Rashaad Crespo MRN: [id number]  SSN: [id number]    YOB: 1965  Age: 50 y.o. Sex: female          Subjective:   Rashaad Crespo is a 50 y.o. female who is here for follow up visit. The patient complains of continued hypoglycemia at least 2-3 times a week. The patient has not been able to eat consistently as well. The patient has had episodes of hypoglycemia while at dialysis with a BS of 100-125. She has had episodes where her BS has even in the 30-40 range in the evening when sleeping. The patient mentions that she had to call the ambulance due to episodes of hypoglycemia. She has used been having to use glucose tablets to help with her hypoglycemia. The patient mentions that she will go to [place] [facility] for pancreatic transplant. She also mentions [facility] as a possible option as well. She also mentions that she will see Dr. Sadaf Perez tomorrow to deal with her Pancreatic insufficiency. The patient mentions that she has been taking her BP meds consistently. She states that it can get as low as 146/82 while at dialysis. She mentions that when her BP drops below 140 she can get lightheaded.        Objective:     Past Medical History:   Diagnosis Date    Cardiomegaly 10/1/2013    Chronic diastolic heart failure ([address] 10/1/2013    sob on activity     Chronic diastolic heart failure (HCC)     Chronic kidney disease     dialysis    Chronic obstructive pulmonary disease ([address] 10/1/2013    Chronic pain     Cirrhosis ([address]     Dialysis patient ([address] 12/17/2012    ESRD (end stage renal disease) ([address]     ESRD (end stage renal disease) ([address] 7/27/2016    HD since 9/11     Essential hypertension, benign 10/1/2013    severly elevated     Essential hypertension, benign 10/1/2013    severly elevated     Hypertension     Lupus ([address]     Mitral valve disorders 10/1/2013    Mitral valve disorders 10/1/2013    On home oxygen therapy     3 liters /min NC    Pancreatitis  Pancreatitis chronic     Precordial pain     Stroke [facility])     1998    Tricuspid valve disorders, specified as nonrheumatic 10/1/2013        Vitals:    09/05/17 1113   BP: (!) 182/114   Pulse: 92   Resp: 20   Temp: 98.7 °F (37.1 °C)   TempSrc: Oral   Weight: 152 lb 8 oz (69.2 kg)   Height: 5' 8\" (1.727 m)            Review of Systems:  Pertinent items are noted in the History of Present Illness. Physical Exam:   GENERAL: alert, cooperative, no distress, appears stated age  EYE: conjunctivae/corneas clear. PERRL, EOM's intact. Fundi benign  LYMPHATIC: Cervical, supraclavicular, and axillary nodes normal.   THROAT & NECK: normal, no erythema or exudates noted. and poor dentition   LUNG: clear to auscultation bilaterally  HEART: regular rate and rhythm, S1, S2 normal, no murmur, click, rub or gallop  ABDOMEN: soft, non-tender. Bowel sounds normal. No masses,  no organomegaly, abnormal findings:  Mildly obese  EXTREMITIES:  Fistulae noted in both arms bilaterally. Hematoma noted above fistula in left arm---warm to touch. SKIN: Multiple scars on upper extremity from previous fistulae       Lab/Data Review:  Labs ordered as noted below      Assessment:       1.) Exocrine Pancreatic Insufficiency w/ history of pancreatic cyst: Patient will see if she can get into [facility] transplant list to see if there  Is a way to get a pancreatic-renal transplant at the same time. She will continue to follow with Dr. Dagmar Echevarria. 2.) Vitamin D Deficiency: Vitamin D level drawn in the office today. 3.) End Stage Renal Disease on Hemodialysis: Patient will continue on dialysis M, W, F.     4.) Essential Hypertension: Patient will continue on current meds but will contact her cardiologist about possible changes to her current BP medication regimen. 5.) COPD: Patient stable on Anoro and albuterol.      6.) Jerking of Muscles: Magnesium, Phosphorus and Ionized Calcium reviewed and were within normal limits. This encounter including interview, exam, evaluation and discussion/ counseling was approximately 25 minutes. Patient will return in 3 months for follow up.       Plan:     Orders Placed This Encounter    VITAMIN D, 25 HYDROXY         Signed By: Jacey Hollingsworth DO     September 5, 2017

## 2017-09-09 PROBLEM — E55.9 VITAMIN D DEFICIENCY: Status: ACTIVE | Noted: 2017-01-01

## 2017-09-10 NOTE — TELEPHONE ENCOUNTER
Justus Beal,   Please let the patient know that the Vitamin D is extremely low. I am reordering this for her. Thanks.     DAMIR

## 2023-12-14 NOTE — ROUTINE PROCESS
TRANSFER - IN REPORT:    Verbal report received from Linda Palma RN(name) on Sanford Perdomo  being received from [facility](unit) for routine progression of care      Report consisted of patients Situation, Background, Assessment and   Recommendations(SBAR). Information from the following report(s) SBAR, Procedure Summary and MAR was reviewed with the receiving nurse. Opportunity for questions and clarification was provided. Assessment completed upon patients arrival to unit and care assumed.
TRANSFER - OUT REPORT:    Verbal report given to [facility], RN. (name) on Lala Tyler  being transferred to [facility] ([facility] for routine progression of care       Report consisted of patients Situation, Background, Assessment and   Recommendations(SBAR). Information from the following report(s) SBAR, Procedure Summary and MAR was reviewed with the receiving nurse. Lines:   Peripheral IV 06/08/17 Right Forearm (Active)   Site Assessment Clean, dry, & intact 6/8/2017  8:00 AM   Phlebitis Assessment 0 6/8/2017  8:00 AM   Infiltration Assessment 0 6/8/2017  8:00 AM   Dressing Status New 6/8/2017  8:00 AM   Dressing Type Transparent 6/8/2017  8:00 AM   Hub Color/Line Status Blue;Flushed;Patent 6/8/2017  8:00 AM   Action Taken Blood drawn 6/8/2017  8:00 AM   Alcohol Cap Used Yes 6/8/2017  8:00 AM       Peripheral IV 06/08/17 Right Antecubital (Active)   Site Assessment Clean, dry, & intact 6/8/2017  8:00 AM   Phlebitis Assessment 0 6/8/2017  8:00 AM   Infiltration Assessment 0 6/8/2017  8:00 AM   Dressing Status New 6/8/2017  8:00 AM   Dressing Type Transparent 6/8/2017  8:00 AM   Hub Color/Line Status Blue;Flushed;Patent 6/8/2017  8:00 AM   Alcohol Cap Used Yes 6/8/2017  8:00 AM        Opportunity for questions and clarification was provided.       Patient transported with:   [employer]
[NL] : warm, clear